# Patient Record
Sex: FEMALE | Race: WHITE | NOT HISPANIC OR LATINO | Employment: FULL TIME | URBAN - METROPOLITAN AREA
[De-identification: names, ages, dates, MRNs, and addresses within clinical notes are randomized per-mention and may not be internally consistent; named-entity substitution may affect disease eponyms.]

---

## 2017-03-13 ENCOUNTER — LAB REQUISITION (OUTPATIENT)
Dept: LAB | Facility: HOSPITAL | Age: 32
End: 2017-03-13
Payer: COMMERCIAL

## 2017-03-13 ENCOUNTER — ALLSCRIPTS OFFICE VISIT (OUTPATIENT)
Dept: OTHER | Facility: OTHER | Age: 32
End: 2017-03-13

## 2017-03-13 DIAGNOSIS — D50.9 IRON DEFICIENCY ANEMIA: ICD-10-CM

## 2017-03-13 DIAGNOSIS — R13.12 DYSPHAGIA, OROPHARYNGEAL PHASE: ICD-10-CM

## 2017-03-13 DIAGNOSIS — R22.1 LOCALIZED SWELLING, MASS OR LUMP OF NECK: ICD-10-CM

## 2017-03-13 DIAGNOSIS — R59.9 ENLARGED LYMPH NODES, UNSPECIFIED: ICD-10-CM

## 2017-03-13 LAB
FERRITIN SERPL-MCNC: 15 NG/ML (ref 8–388)
IRON SERPL-MCNC: 70 UG/DL (ref 50–170)
S PYO AG THROAT QL: NEGATIVE
TIBC SERPL-MCNC: 379 UG/DL (ref 250–450)

## 2017-03-13 PROCEDURE — 82728 ASSAY OF FERRITIN: CPT | Performed by: FAMILY MEDICINE

## 2017-03-13 PROCEDURE — 83540 ASSAY OF IRON: CPT | Performed by: FAMILY MEDICINE

## 2017-03-13 PROCEDURE — 83550 IRON BINDING TEST: CPT | Performed by: FAMILY MEDICINE

## 2017-03-17 ENCOUNTER — ALLSCRIPTS OFFICE VISIT (OUTPATIENT)
Dept: OTHER | Facility: OTHER | Age: 32
End: 2017-03-17

## 2017-03-17 ENCOUNTER — HOSPITAL ENCOUNTER (OUTPATIENT)
Dept: RADIOLOGY | Facility: HOSPITAL | Age: 32
Discharge: HOME | End: 2017-03-17
Payer: COMMERCIAL

## 2017-03-17 DIAGNOSIS — R13.12 DYSPHAGIA, OROPHARYNGEAL PHASE: ICD-10-CM

## 2017-03-17 PROCEDURE — 74220 X-RAY XM ESOPHAGUS 1CNTRST: CPT

## 2017-03-18 ENCOUNTER — APPOINTMENT (OUTPATIENT)
Dept: LAB | Facility: MEDICAL CENTER | Age: 32
End: 2017-03-18
Payer: COMMERCIAL

## 2017-03-18 DIAGNOSIS — R13.12 DYSPHAGIA, OROPHARYNGEAL PHASE: ICD-10-CM

## 2017-03-18 DIAGNOSIS — D50.9 IRON DEFICIENCY ANEMIA: ICD-10-CM

## 2017-03-18 DIAGNOSIS — R22.1 LOCALIZED SWELLING, MASS OR LUMP OF NECK: ICD-10-CM

## 2017-03-18 LAB
ERYTHROCYTE [DISTWIDTH] IN BLOOD BY AUTOMATED COUNT: 12.1 % (ref 11.6–15.1)
HCT VFR BLD AUTO: 37.6 % (ref 34.8–46.1)
HGB BLD-MCNC: 12.1 G/DL (ref 11.5–15.4)
MCH RBC QN AUTO: 29.2 PG (ref 26.8–34.3)
MCHC RBC AUTO-ENTMCNC: 32.2 G/DL (ref 31.4–37.4)
MCV RBC AUTO: 91 FL (ref 82–98)
PLATELET # BLD AUTO: 281 THOUSANDS/UL (ref 149–390)
PMV BLD AUTO: 9.5 FL (ref 8.9–12.7)
RBC # BLD AUTO: 4.15 MILLION/UL (ref 3.81–5.12)
T4 FREE SERPL-MCNC: 1.05 NG/DL (ref 0.76–1.46)
TSH SERPL DL<=0.05 MIU/L-ACNC: 0.84 UIU/ML (ref 0.36–3.74)
WBC # BLD AUTO: 7.51 THOUSAND/UL (ref 4.31–10.16)

## 2017-03-18 PROCEDURE — 85027 COMPLETE CBC AUTOMATED: CPT

## 2017-03-18 PROCEDURE — 36415 COLL VENOUS BLD VENIPUNCTURE: CPT

## 2017-03-18 PROCEDURE — 84439 ASSAY OF FREE THYROXINE: CPT

## 2017-03-18 PROCEDURE — 84443 ASSAY THYROID STIM HORMONE: CPT

## 2017-03-20 ENCOUNTER — GENERIC CONVERSION - ENCOUNTER (OUTPATIENT)
Dept: OTHER | Facility: OTHER | Age: 32
End: 2017-03-20

## 2017-03-28 ENCOUNTER — HOSPITAL ENCOUNTER (OUTPATIENT)
Dept: CT IMAGING | Facility: HOSPITAL | Age: 32
Discharge: HOME | End: 2017-03-28
Payer: COMMERCIAL

## 2017-03-28 DIAGNOSIS — R22.1 LOCALIZED SWELLING, MASS OR LUMP OF NECK: ICD-10-CM

## 2017-03-28 DIAGNOSIS — R13.12 DYSPHAGIA, OROPHARYNGEAL PHASE: ICD-10-CM

## 2017-03-28 DIAGNOSIS — R59.9 ENLARGED LYMPH NODES, UNSPECIFIED: ICD-10-CM

## 2017-03-28 PROCEDURE — 70491 CT SOFT TISSUE NECK W/DYE: CPT

## 2017-03-28 RX ADMIN — IOHEXOL 85 ML: 350 INJECTION, SOLUTION INTRAVENOUS at 19:49

## 2017-03-29 ENCOUNTER — GENERIC CONVERSION - ENCOUNTER (OUTPATIENT)
Dept: OTHER | Facility: OTHER | Age: 32
End: 2017-03-29

## 2017-03-30 ENCOUNTER — GENERIC CONVERSION - ENCOUNTER (OUTPATIENT)
Dept: OTHER | Facility: OTHER | Age: 32
End: 2017-03-30

## 2017-05-10 ENCOUNTER — GENERIC CONVERSION - ENCOUNTER (OUTPATIENT)
Dept: OTHER | Facility: OTHER | Age: 32
End: 2017-05-10

## 2017-05-21 ENCOUNTER — OFFICE VISIT (OUTPATIENT)
Dept: URGENT CARE | Facility: MEDICAL CENTER | Age: 32
End: 2017-05-21
Payer: COMMERCIAL

## 2017-05-21 PROCEDURE — 99202 OFFICE O/P NEW SF 15 MIN: CPT

## 2017-05-22 ENCOUNTER — GENERIC CONVERSION - ENCOUNTER (OUTPATIENT)
Dept: OTHER | Facility: OTHER | Age: 32
End: 2017-05-22

## 2017-06-05 ENCOUNTER — ALLSCRIPTS OFFICE VISIT (OUTPATIENT)
Dept: OTHER | Facility: OTHER | Age: 32
End: 2017-06-05

## 2017-06-19 ENCOUNTER — ALLSCRIPTS OFFICE VISIT (OUTPATIENT)
Dept: OTHER | Facility: OTHER | Age: 32
End: 2017-06-19

## 2017-06-19 ENCOUNTER — LAB REQUISITION (OUTPATIENT)
Dept: LAB | Facility: HOSPITAL | Age: 32
End: 2017-06-19
Payer: COMMERCIAL

## 2017-06-19 DIAGNOSIS — R20.2 PARESTHESIA OF SKIN: ICD-10-CM

## 2017-06-19 DIAGNOSIS — L03.032 CELLULITIS OF TOE OF LEFT FOOT: ICD-10-CM

## 2017-06-19 LAB
ALBUMIN SERPL BCP-MCNC: 4.1 G/DL (ref 3.5–5)
ALP SERPL-CCNC: 75 U/L (ref 46–116)
ALT SERPL W P-5'-P-CCNC: 18 U/L (ref 12–78)
ANION GAP SERPL CALCULATED.3IONS-SCNC: 7 MMOL/L (ref 4–13)
AST SERPL W P-5'-P-CCNC: 13 U/L (ref 5–45)
BASOPHILS # BLD AUTO: 0.01 THOUSANDS/ΜL (ref 0–0.1)
BASOPHILS NFR BLD AUTO: 0 % (ref 0–1)
BILIRUB SERPL-MCNC: 0.42 MG/DL (ref 0.2–1)
BUN SERPL-MCNC: 9 MG/DL (ref 5–25)
CALCIUM SERPL-MCNC: 9.1 MG/DL (ref 8.3–10.1)
CHLORIDE SERPL-SCNC: 107 MMOL/L (ref 100–108)
CO2 SERPL-SCNC: 27 MMOL/L (ref 21–32)
CREAT SERPL-MCNC: 0.6 MG/DL (ref 0.6–1.3)
EOSINOPHIL # BLD AUTO: 0.07 THOUSAND/ΜL (ref 0–0.61)
EOSINOPHIL NFR BLD AUTO: 1 % (ref 0–6)
ERYTHROCYTE [DISTWIDTH] IN BLOOD BY AUTOMATED COUNT: 12.2 % (ref 11.6–15.1)
ERYTHROCYTE [SEDIMENTATION RATE] IN BLOOD: 22 MM/HOUR (ref 0–20)
GFR SERPL CREATININE-BSD FRML MDRD: >60 ML/MIN/1.73SQ M
GLUCOSE P FAST SERPL-MCNC: 95 MG/DL (ref 65–99)
HCT VFR BLD AUTO: 36.7 % (ref 34.8–46.1)
HGB BLD-MCNC: 12.1 G/DL (ref 11.5–15.4)
LYMPHOCYTES # BLD AUTO: 2.59 THOUSANDS/ΜL (ref 0.6–4.47)
LYMPHOCYTES NFR BLD AUTO: 29 % (ref 14–44)
MCH RBC QN AUTO: 29.3 PG (ref 26.8–34.3)
MCHC RBC AUTO-ENTMCNC: 33 G/DL (ref 31.4–37.4)
MCV RBC AUTO: 89 FL (ref 82–98)
MONOCYTES # BLD AUTO: 0.56 THOUSAND/ΜL (ref 0.17–1.22)
MONOCYTES NFR BLD AUTO: 6 % (ref 4–12)
NEUTROPHILS # BLD AUTO: 5.86 THOUSANDS/ΜL (ref 1.85–7.62)
NEUTS SEG NFR BLD AUTO: 64 % (ref 43–75)
NRBC BLD AUTO-RTO: 0 /100 WBCS
PLATELET # BLD AUTO: 263 THOUSANDS/UL (ref 149–390)
PMV BLD AUTO: 9.8 FL (ref 8.9–12.7)
POTASSIUM SERPL-SCNC: 4.3 MMOL/L (ref 3.5–5.3)
PROT SERPL-MCNC: 7.8 G/DL (ref 6.4–8.2)
RBC # BLD AUTO: 4.13 MILLION/UL (ref 3.81–5.12)
SODIUM SERPL-SCNC: 141 MMOL/L (ref 136–145)
URATE SERPL-MCNC: 4.6 MG/DL (ref 2–6.8)
VIT B12 SERPL-MCNC: 458 PG/ML (ref 100–900)
WBC # BLD AUTO: 9.1 THOUSAND/UL (ref 4.31–10.16)

## 2017-06-19 PROCEDURE — 87205 SMEAR GRAM STAIN: CPT | Performed by: FAMILY MEDICINE

## 2017-06-19 PROCEDURE — 80053 COMPREHEN METABOLIC PANEL: CPT | Performed by: FAMILY MEDICINE

## 2017-06-19 PROCEDURE — 86038 ANTINUCLEAR ANTIBODIES: CPT | Performed by: FAMILY MEDICINE

## 2017-06-19 PROCEDURE — 82607 VITAMIN B-12: CPT | Performed by: FAMILY MEDICINE

## 2017-06-19 PROCEDURE — 85025 COMPLETE CBC W/AUTO DIFF WBC: CPT | Performed by: FAMILY MEDICINE

## 2017-06-19 PROCEDURE — 87070 CULTURE OTHR SPECIMN AEROBIC: CPT | Performed by: FAMILY MEDICINE

## 2017-06-19 PROCEDURE — 85652 RBC SED RATE AUTOMATED: CPT | Performed by: FAMILY MEDICINE

## 2017-06-19 PROCEDURE — 84550 ASSAY OF BLOOD/URIC ACID: CPT | Performed by: FAMILY MEDICINE

## 2017-06-21 LAB — RYE IGE QN: NEGATIVE

## 2017-06-22 LAB
BACTERIA WND AEROBE CULT: NORMAL
GRAM STN SPEC: NORMAL

## 2017-06-27 ENCOUNTER — ALLSCRIPTS OFFICE VISIT (OUTPATIENT)
Dept: OTHER | Facility: OTHER | Age: 32
End: 2017-06-27

## 2017-08-03 ENCOUNTER — ALLSCRIPTS OFFICE VISIT (OUTPATIENT)
Dept: OTHER | Facility: OTHER | Age: 32
End: 2017-08-03

## 2017-08-08 ENCOUNTER — GENERIC CONVERSION - ENCOUNTER (OUTPATIENT)
Dept: OTHER | Facility: OTHER | Age: 32
End: 2017-08-08

## 2017-08-14 ENCOUNTER — GENERIC CONVERSION - ENCOUNTER (OUTPATIENT)
Dept: OTHER | Facility: OTHER | Age: 32
End: 2017-08-14

## 2017-08-14 LAB
ADEQUACY: (HISTORICAL): ABNORMAL
CLINICIAN PROVIDIED ICD 9 OR 10 (HISTORICAL): ABNORMAL
COMMENT (HISTORICAL): ABNORMAL
DIAGNOSIS (HISTORICAL): ABNORMAL
ELECTRONICALLY SIGNED BY (HISTORICAL): ABNORMAL
HPV HIGH RISK (HISTORICAL): NEGATIVE
NOTE: (HISTORICAL): ABNORMAL
PATHOLOGIST PROVIDED ICD10 (HISTORICAL): ABNORMAL
PERFORMED BY (HISTORICAL): ABNORMAL
RECOMMENDATION (HISTORICAL): ABNORMAL
TEST INFORMATION (HISTORICAL): ABNORMAL

## 2018-01-10 NOTE — RESULT NOTES
Verified Results  (1) CBC/ PLT (NO DIFF) [id number] 09:48AM Belinda Spruce   TW Order Number: [id number]     Test Name Result Flag Reference   HEMATOCRIT 37 6 %  34 8-46 1   HEMOGLOBIN 12 1 g/dL  11 5-15 4   MCHC 32 2 g/dL  31 4-37 4   MCH 29 2 pg  26 8-34 3   MCV 91 fL  82-98   PLATELET COUNT 287 Thousands/uL  149-390   RBC COUNT 4 15 Million/uL  3 81-5 12   RDW 12 1 %  11 6-15 1   WBC COUNT 7 51 Thousand/uL  4 31-10 16   MPV 9 5 fL  8 9-12 7     (1) TSH [id number] 09:48AM Belinda Spruce   TW Order Number: [id number]     Test Name Result Flag Reference   TSH 0 837 uIU/mL  0 358-3 740   - Patient Instructions: This bloodwork is non-fasting  Please drink two glasses of water morning of bloodwork  - Patient Instructions: This bloodwork is non-fasting  Please drink two glasses of water morning of bloodwork  Patients undergoing fluorescein dye angiography may retain small amounts of fluorescein in the body for 48-72 hours post procedure  Samples containing fluorescein can produce falsely depressed TSH values  If the patient had this procedure,a specimen should be resubmitted post fluorescein clearance  The recommended reference ranges for TSH during pregnancy are as follows:  First trimester 0 1 to 2 5 uIU/mL  Second trimester  0 2 to 3 0 uIU/mL  Third trimester 0 3 to 3 0 uIU/m     (1) T4, FREE [id number] 09:48AM Belinda Spruce   TW Order Number: [id number]     Test Name Result Flag Reference   T4,FREE 1 05 ng/dL  0 76-1 46   - Patient Instructions: This bloodwork is non-fasting  Please drink two glasses of water morning of bloodwork         Plan  Iron deficiency anemia, Oropharyngeal dysphagia, Sensation of lump in throat    · US THYROID; Status:Canceled - Scheduling;   Lymphoid hyperplasia, Oropharyngeal dysphagia, Sensation of lump in throat    · 2 - Wakstein DO, Pink Bernheim (Otolaryngology) Physician Referral  Consult Only: the  expectation is that the referring provider will communicate back to the patient on  treatment options  Evaluation and Treatment: the expectation is that the referred to  provider will communicate back to the patient on treatment options  Status: Hold For  - Scheduling  Requested for: [id number]  Care Summary provided  : Yes   · * CT SOFT TISSUE NECK W CONTRAST; Status:Need Information - Financial  Authorization;  Requested for:20Mar2017;

## 2018-01-12 VITALS
DIASTOLIC BLOOD PRESSURE: 80 MMHG | HEART RATE: 81 BPM | TEMPERATURE: 98.6 F | HEIGHT: 67 IN | SYSTOLIC BLOOD PRESSURE: 120 MMHG | BODY MASS INDEX: 25.11 KG/M2 | OXYGEN SATURATION: 99 % | WEIGHT: 160 LBS

## 2018-01-12 VITALS
TEMPERATURE: 97.6 F | RESPIRATION RATE: 16 BRPM | HEIGHT: 67 IN | DIASTOLIC BLOOD PRESSURE: 80 MMHG | OXYGEN SATURATION: 88 % | BODY MASS INDEX: 26.53 KG/M2 | HEART RATE: 83 BPM | SYSTOLIC BLOOD PRESSURE: 114 MMHG | WEIGHT: 169.06 LBS

## 2018-01-13 VITALS
OXYGEN SATURATION: 98 % | RESPIRATION RATE: 16 BRPM | SYSTOLIC BLOOD PRESSURE: 140 MMHG | WEIGHT: 165 LBS | HEIGHT: 67 IN | HEART RATE: 73 BPM | DIASTOLIC BLOOD PRESSURE: 80 MMHG | TEMPERATURE: 96.6 F | BODY MASS INDEX: 25.9 KG/M2

## 2018-01-13 VITALS
SYSTOLIC BLOOD PRESSURE: 128 MMHG | BODY MASS INDEX: 25.27 KG/M2 | DIASTOLIC BLOOD PRESSURE: 80 MMHG | WEIGHT: 161 LBS | HEIGHT: 67 IN

## 2018-01-13 VITALS
WEIGHT: 157.06 LBS | HEART RATE: 70 BPM | SYSTOLIC BLOOD PRESSURE: 128 MMHG | BODY MASS INDEX: 24.65 KG/M2 | DIASTOLIC BLOOD PRESSURE: 90 MMHG | HEIGHT: 67 IN | TEMPERATURE: 97.9 F | OXYGEN SATURATION: 99 % | RESPIRATION RATE: 16 BRPM

## 2018-01-13 VITALS
HEART RATE: 68 BPM | WEIGHT: 162 LBS | SYSTOLIC BLOOD PRESSURE: 110 MMHG | DIASTOLIC BLOOD PRESSURE: 70 MMHG | OXYGEN SATURATION: 99 % | TEMPERATURE: 98.1 F | HEIGHT: 67 IN | BODY MASS INDEX: 25.43 KG/M2

## 2018-01-13 NOTE — RESULT NOTES
Verified Results  (LC) PapIG, HPV, rfx 16/18 [id number] 12:00AM ReneParis spears   No  of containers  [facility] 01 CYTYC Thin Prep Vial     Test Name Result Flag Reference   DIAGNOSIS: Comment A    EPITHELIAL CELL ABNORMALITY  ATYPICAL SQUAMOUS CELLS OF UNDETERMINED SIGNIFICANCE  Recommendation: Comment A    Suggest follow up as clinically appropriate  Specimen adequacy: Comment     Satisfactory for evaluation  Endocervical and/or squamous metaplastic  cells (endocervical component) are present  Clinician provided ICD10: Comment     Z01 419   Performed by: Denae Flores, Cytotechnologist (ASCP)   Electronically signed by: Jeremy Cerrato DO, FCAP, Pathologist   Comm   Pathologist provided ICD10: Comment     R87 610   Note: Comment     The Pap smear is a screening test designed to aid in the detection of  premalignant and malignant conditions of the uterine cervix  It is not a  diagnostic procedure and should not be used as the sole means of detecting  cervical cancer  Both false-positive and false-negative reports do occur  Test Methodology: Comment     This liquid based ThinPrep(R) pap test was screened with the  use of an image guided system  HPV, high-risk Negative  Negative   This high-risk HPV test detects thirteen high-risk types  (16/18/31/33/35/39/45/51/52/56/58/59/68) without differentiation

## 2018-01-16 NOTE — RESULT NOTES
Verified Results  * CT SOFT TISSUE NECK W CONTRAST [id number] 07:21PM [facility] Order Number: [id number]   Performing Comments: abnormal barium swallow - lymphoid hyperplasia base of tongue with swallowing difficulty and fullness in neck  - Patient Instructions: To schedule this appointment, please contact Central Scheduling at [phone]  Test Name Result Flag Reference   CT SOFT TISSUE NECK W CONTRAST (Report)     CT NECK WITH CONTRAST     INDICATION: R13 12: Dysphagia, oropharyngeal phase   R22 1: Localized swelling, mass and lump, neck   R59 9: Enlarged lymph nodes, unspecified  History taken directly from the electronic ordering system  COMPARISON: Esophagram performed on 3/17/2017     TECHNIQUE: Contiguous 2 5 mm images were obtained through the neck after administration of intravenous contrast       Radiation dose length product (DLP) for this visit: 471 mGy-cm   This examination, like all CT scans performed in the [facility], was performed utilizing techniques to minimize radiation dose exposure, including the use of iterative    reconstruction and automated exposure control  IV Contrast: 85 mL of iohexol (OMNIPAQUE)          IMAGE QUALITY: Diagnostic  FINDINGS:     VISUALIZED BRAIN PARENCHYMA: No acute intracranial pathology of the visualized brain parenchyma  VISUALIZED ORBITS AND PARANASAL SINUSES: Normal      NASAL CAVITY AND NASOPHARYNX: Normal      SUPRAHYOID NECK: Normal oral cavity, tongue base, tonsillar fossa and epiglottis  Prevertebral soft tissues are normal  Mild prominence of lingual tonsils without evidence for mass and within normal limits  INFRAHYOID NECK: Aryepiglottic folds, laryngeal tissues, and piriform sinuses are normal         THYROID GLAND: 9 mm nodule in the right thyroid lobe  Does not require further follow-up  PAROTID AND SUBMANDIBULAR GLANDS: Normal      LYMPH NODES: No pathologic or enlarged adenopathy  VASCULAR STRUCTURES: Normal enhancement of the cervical vasculature  THORACIC INLET: Lung apices and upper mediastinum are unremarkable  BONY STRUCTURES: Normal        IMPRESSION:     No soft tissue mass or pathologic adenopathy in the neck  Aerodigestive tract is unremarkable as detailed         Workstation performed: [id number]     Signed by:   Alivia Nuñez MD   3/29/17

## 2018-06-26 ENCOUNTER — OFFICE VISIT (OUTPATIENT)
Dept: URGENT CARE | Facility: MEDICAL CENTER | Age: 33
End: 2018-06-26
Payer: COMMERCIAL

## 2018-06-26 VITALS
OXYGEN SATURATION: 100 % | HEIGHT: 66 IN | BODY MASS INDEX: 25.71 KG/M2 | SYSTOLIC BLOOD PRESSURE: 124 MMHG | HEART RATE: 62 BPM | DIASTOLIC BLOOD PRESSURE: 82 MMHG | WEIGHT: 160 LBS | RESPIRATION RATE: 18 BRPM | TEMPERATURE: 98.4 F

## 2018-06-26 DIAGNOSIS — R21 RASH: Primary | ICD-10-CM

## 2018-06-26 PROCEDURE — 99213 OFFICE O/P EST LOW 20 MIN: CPT | Performed by: NURSE PRACTITIONER

## 2018-06-26 RX ORDER — CETIRIZINE HYDROCHLORIDE 10 MG/1
TABLET ORAL
COMMUNITY

## 2018-06-26 RX ORDER — PREDNISONE 10 MG/1
10 TABLET ORAL DAILY
Qty: 21 TABLET | Refills: 0 | Status: SHIPPED | OUTPATIENT
Start: 2018-06-26 | End: 2018-07-25 | Stop reason: ALTCHOICE

## 2018-06-26 NOTE — PROGRESS NOTES
[facility]        NAME: Nalini Ledezma is a 28 y o  female  : 1985    MRN: [id number]  DATE: 2018  TIME: 6:58 PM    Assessment and Plan   Rash [R21]  1  Rash  predniSONE 10 mg tablet         Patient Instructions     1  Prednisone as directed  2  Benadryl at night for itching   3  Keep area clean and dry   4  Follow up with your PCP if rash worsens or does not resolve  Chief Complaint     Chief Complaint   Patient presents with    Urticaria     itchy lesions began Weds: have spread to upper back and chest/neck  topical preps not effective         History of Present Illness       27 y/o female presents for rash that started on the rght side of her back 6 days ago  It is now spreading to her upper back and upper anterior chest/neck  It is itchy  She has applied benadryl cream and cortisone cream without improvement  She takes Zrytec daily for environmental allergies  She denies fever, chills, or pain  She denies exposure to any new hygiene or cleaning products  Urticaria   Pertinent negatives include no fever or sore throat  Review of Systems   Review of Systems   Constitutional: Negative for activity change and fever  HENT: Negative for sore throat  Respiratory: Negative for wheezing  Skin: Positive for rash  Negative for color change           Current Medications       Current Outpatient Prescriptions:     cetirizine (ZYRTEC ALLERGY) 10 mg tablet, Take by mouth, Disp: , Rfl:     predniSONE 10 mg tablet, Take 1 tablet (10 mg total) by mouth daily 60mg days 1, 50mg day 2, 40mg day 3, 30mg day 4, 20 mg day 5, 10mg day 6 , Disp: 21 tablet, Rfl: 0    Current Allergies     Allergies as of 2018    (No Known Allergies)            The following portions of the patient's history were reviewed and updated as appropriate: allergies, current medications, past family history, past medical history, past social history, past surgical history and problem list      No past medical history on file  No past surgical history on file  No family history on file  Medications have been verified  Objective   /82   Pulse 62   Temp 98 4 °F (36 9 °C)   Resp 18   Ht 5' 6" (1 676 m)   Wt 72 6 kg (160 lb)   SpO2 100%   BMI 25 82 kg/m²        Physical Exam     Physical Exam   Constitutional: She is oriented to person, place, and time  Vital signs are normal  She appears well-developed and well-nourished  She is active  No distress  HENT:   Head: Normocephalic  Cardiovascular: Normal rate  Pulmonary/Chest: Effort normal    Neurological: She is alert and oriented to person, place, and time  No cranial nerve deficit  Skin: Skin is warm and dry  Rash noted   Rash is maculopapular and urticarial

## 2018-06-26 NOTE — PATIENT INSTRUCTIONS
1  Prednisone as directed  2  Benadryl at night for itching   3  Keep area clean and dry   4  Follow up with your PCP if rash worsens or does not resolve  Acute Rash   WHAT YOU NEED TO KNOW:   A rash is irritation, redness, or itchiness in the skin or mucus membranes  Mucus membranes are areas such as the lining of your nose or throat  Acute means the rash starts suddenly, worsens quickly, and lasts a short time  An acute rash may be caused by a disease, such as hepatitis or vasculitis  The rash may be a reaction to something you are allergic to, such as certain foods, or latex  Certain medicines, including antibiotics, NSAIDs, prescription pain medicine, and aspirin can also cause a rash  DISCHARGE INSTRUCTIONS:   Return to the emergency department if:   · You have sudden trouble breathing or chest pain  · You are vomiting, have a headache or muscle aches, and your throat hurts  Contact your healthcare provider if:   · You have a fever  · You get open wounds from scratching your skin, or you have a wound that is red, swollen, or painful  · Your rash lasts longer than 3 months  · You have swelling or pain in your joints  · You have questions or concerns about your condition or care  Medicines:  If your rash does not go away on its own, you may need the following medicines:  · Antihistamines  may be given to help decrease itching  · Steroids  may be given to decrease inflammation  · Antibiotics  help fight or prevent a bacterial infection  · Take your medicine as directed  Contact your healthcare provider if you think your medicine is not helping or if you have side effects  Tell him of her if you are allergic to any medicine  Keep a list of the medicines, vitamins, and herbs you take  Include the amounts, and when and why you take them  Bring the list or the pill bottles to follow-up visits  Carry your medicine list with you in case of an emergency    Prevent a rash or care for your skin when you have a rash:  Dry skin can lead to more problems  Do not scratch your skin if it itches  You may cause a skin infection by scratching  The following may prevent dry skin, and help your skin look better:  · Use thick cream lotions or petroleum jelly to help soothe your rash  These products work well on areas with thick skin, such as your feet  Cool compresses may also be used to soothe your skin  Apply a cool compress or a cool, wet towel, and then cover it with a dry towel  · Use lukewarm water when you bathe  Hot water may damage your skin more  Pat your skin dry  Do not rub your skin with a towel  · Use detergents, soaps, shampoos, and bubble baths made for sensitive skin  Wear clothes that are made of cotton instead of nylon or wool  Cotton is softer, so it will not hurt your skin as much  Follow up with your healthcare provider as directed: You may need to see a dermatologist if healthcare providers do not know what is causing your rash  You may also need to see a dermatologist if your rash does not get better even with treatment  You may need to see a dietitian if you have allergies to foods  Write down your questions so you remember to ask them during your visits  © 2017 [facility] Information is for End User's use only and may not be sold, redistributed or otherwise used for commercial purposes  All illustrations and images included in CareNotes® are the copyrighted property of [employer]  or [address]  The above information is an  only  It is not intended as medical advice for individual conditions or treatments  Talk to your doctor, nurse or pharmacist before following any medical regimen to see if it is safe and effective for you

## 2018-07-25 ENCOUNTER — OFFICE VISIT (OUTPATIENT)
Dept: URGENT CARE | Facility: MEDICAL CENTER | Age: 33
End: 2018-07-25
Payer: COMMERCIAL

## 2018-07-25 VITALS
DIASTOLIC BLOOD PRESSURE: 64 MMHG | WEIGHT: 160 LBS | HEART RATE: 74 BPM | TEMPERATURE: 97.9 F | BODY MASS INDEX: 25.71 KG/M2 | RESPIRATION RATE: 18 BRPM | HEIGHT: 66 IN | SYSTOLIC BLOOD PRESSURE: 100 MMHG | OXYGEN SATURATION: 99 %

## 2018-07-25 DIAGNOSIS — L25.9 CONTACT DERMATITIS, UNSPECIFIED CONTACT DERMATITIS TYPE, UNSPECIFIED TRIGGER: Primary | ICD-10-CM

## 2018-07-25 PROCEDURE — 99213 OFFICE O/P EST LOW 20 MIN: CPT | Performed by: PHYSICIAN ASSISTANT

## 2018-07-25 RX ORDER — PREDNISONE 20 MG/1
TABLET ORAL
Qty: 15 TABLET | Refills: 0 | Status: SHIPPED | OUTPATIENT
Start: 2018-07-25 | End: 2018-12-03 | Stop reason: ALTCHOICE

## 2018-07-26 NOTE — PATIENT INSTRUCTIONS
Contact Dermatitis   AMBULATORY CARE:   Contact dermatitis  is a rash  It develops when you touch something that irritates your skin or causes an allergic reaction  Common signs and symptoms include the following:   · Red, swollen, painful rash           · Skin that itches, stings, or burns    · Dry, scaly, or crusty skin patches    · Bumps or blisters    · Fluid draining from blisters  Call 911 for any of the following:   · You have sudden trouble breathing  · Your throat swells and you have trouble eating  · Your face is swollen  Contact your healthcare provider if:   · You have a fever  · Your blisters are draining pus  · Your rash spreads or does not get better, even after treatment  · You have questions or concerns about your condition or care  Treatment for contact dermatitis  involves removing any irritants or allergens that cause your rash  You may also need medicines to decrease itching and swelling  They will be given as a topical medicine to apply to your rash or as a pill  Manage contact dermatitis:   · Take short baths or showers in cool water  Use mild soap or soap-free cleansers  Add oatmeal, baking soda, or cornstarch to the bath water to help decrease skin irritation  · Avoid skin irritants , such as makeup, hair products, soaps, and cleansers  Use products that do not contain perfume or dye  · Apply a cool compress to your rash  This will help soothe your skin  · Keep your skin moist   Rub unscented cream or lotion on your skin to prevent dryness and itching  Do this right after a bath or shower when your skin is still damp  Follow up with your healthcare provider as directed:  Write down your questions so you remember to ask them during your visits  © 2017 [employer] Information is for End User's use only and may not be sold, redistributed or otherwise used for commercial purposes   All illustrations and images included in CareNotes® are the copyrighted property of [employer]  or Sergio Hicks  The above information is an  only  It is not intended as medical advice for individual conditions or treatments  Talk to your doctor, nurse or pharmacist before following any medical regimen to see if it is safe and effective for you

## 2018-07-26 NOTE — PROGRESS NOTES
[facility]        NAME: Donte Saldaña is a 28 y o  female  : 1985    MRN: [id number]  DATE: 2018  TIME: 8:33 PM    Assessment and Plan   Contact dermatitis, unspecified contact dermatitis type, unspecified trigger [L25 9]  1  Contact dermatitis, unspecified contact dermatitis type, unspecified trigger  predniSONE 20 mg tablet    fluocinonide (LIDEX) 0 05 % cream         Patient Instructions       Advised to use the cream arm chest, did not use on face or eyes  Take prednisone as directed  Continued taking Zyrtec, take Benadryl at night  Follow up with PCP in 3-5 days  Proceed to  ER if symptoms worsen  Chief Complaint     Chief Complaint   Patient presents with    Rash     x 2 weeks to LUE;more pronounce (and poss spreading?) last day or so         History of Present Illness   Donte Saldaña presents to the clinic c/o     28 female presents for evaluation of a rash on her left arm for approximately 2 weeks  Patient states that has worsened, and now spread onto her chest and on her face  She does have a history of eczema she has been taking Zyrtec daily as well as getting allergy shots  This has worsened over the last few days, she states that she is trying to that she  She has been applying topical Benadryl as well as trying to use alcohol to dry out the rash  Rash         Review of Systems   Review of Systems   Respiratory: Negative  Cardiovascular: Negative  Skin: Positive for rash           Current Medications     Long-Term Prescriptions   Medication Sig Dispense Refill    cetirizine (ZYRTEC ALLERGY) 10 mg tablet Take by mouth      fluocinonide (LIDEX) 0 05 % cream Apply topically 2 (two) times a day 30 g 0       Current Allergies     Allergies as of 2018    (No Known Allergies)            The following portions of the patient's history were reviewed and updated as appropriate: allergies, current medications, past family history, past medical history, past social history, past surgical history and problem list     Objective   /64   Pulse 74   Temp 97 9 °F (36 6 °C)   Resp 18   Ht 5' 6" (1 676 m)   Wt 72 6 kg (160 lb)   SpO2 99%   BMI 25 82 kg/m²        Physical Exam     Physical Exam   Constitutional: She appears well-developed and well-nourished  No distress  HENT:   Head: Normocephalic and atraumatic  Cardiovascular: Normal rate, regular rhythm and normal heart sounds  Pulmonary/Chest: Effort normal and breath sounds normal  No respiratory distress  She has no wheezes  She has no rales  Skin: Skin is warm and dry  She is not diaphoretic  Erythematous papular lesions on inner left elbow, that are blanchable  There is also erythema on the chest and face that is blanchable this significant for atopic dermatitis / contact dermatitis   Nursing note and vitals reviewed

## 2018-11-09 ENCOUNTER — CLINICAL SUPPORT (OUTPATIENT)
Dept: OBGYN CLINIC | Facility: MEDICAL CENTER | Age: 33
End: 2018-11-09
Payer: COMMERCIAL

## 2018-11-09 DIAGNOSIS — Z32.01 POSITIVE URINE PREGNANCY TEST: Primary | ICD-10-CM

## 2018-11-09 LAB
B-HCG SERPL-ACNC: 9539 MIU/ML
PROGEST SERPL-MCNC: 13.5 NG/ML

## 2018-11-09 PROCEDURE — 84702 CHORIONIC GONADOTROPIN TEST: CPT | Performed by: OBSTETRICS & GYNECOLOGY

## 2018-11-09 PROCEDURE — 84144 ASSAY OF PROGESTERONE: CPT | Performed by: OBSTETRICS & GYNECOLOGY

## 2018-11-09 PROCEDURE — 36415 COLL VENOUS BLD VENIPUNCTURE: CPT | Performed by: OBSTETRICS & GYNECOLOGY

## 2018-11-12 ENCOUNTER — TELEPHONE (OUTPATIENT)
Dept: OBGYN CLINIC | Facility: MEDICAL CENTER | Age: 33
End: 2018-11-12

## 2018-11-12 DIAGNOSIS — Z32.01 POSITIVE BLOOD PREGNANCY TEST: Primary | ICD-10-CM

## 2018-11-19 ENCOUNTER — HOSPITAL ENCOUNTER (OUTPATIENT)
Dept: ULTRASOUND IMAGING | Facility: MEDICAL CENTER | Age: 33
Discharge: HOME | End: 2018-11-19
Payer: COMMERCIAL

## 2018-11-19 DIAGNOSIS — Z32.01 POSITIVE BLOOD PREGNANCY TEST: ICD-10-CM

## 2018-11-19 PROCEDURE — 76801 OB US < 14 WKS SINGLE FETUS: CPT

## 2018-12-03 ENCOUNTER — INITIAL PRENATAL (OUTPATIENT)
Dept: OBGYN CLINIC | Facility: MEDICAL CENTER | Age: 33
End: 2018-12-03
Payer: COMMERCIAL

## 2018-12-03 DIAGNOSIS — Z34.91 ENCOUNTER FOR PREGNANCY RELATED EXAMINATION IN FIRST TRIMESTER: Primary | ICD-10-CM

## 2018-12-03 PROCEDURE — 81001 URINALYSIS AUTO W/SCOPE: CPT

## 2018-12-03 PROCEDURE — 87147 CULTURE TYPE IMMUNOLOGIC: CPT | Performed by: NURSE PRACTITIONER

## 2018-12-03 PROCEDURE — 80081 OBSTETRIC PANEL INC HIV TSTG: CPT | Performed by: NURSE PRACTITIONER

## 2018-12-03 PROCEDURE — OBC: Performed by: OBSTETRICS & GYNECOLOGY

## 2018-12-03 PROCEDURE — 36415 COLL VENOUS BLD VENIPUNCTURE: CPT | Performed by: OBSTETRICS & GYNECOLOGY

## 2018-12-03 PROCEDURE — 87086 URINE CULTURE/COLONY COUNT: CPT | Performed by: NURSE PRACTITIONER

## 2018-12-03 NOTE — PROGRESS NOTES
OB Intake  o Patient presents for OB intake interview    o Hx of  delivery prior to 36 weeks 6 days: NO  o LMP: Patient's last menstrual period was 2018   o Tdap:  - Counseled to be given after 28 weeks  o Influenza vaccine discussed   Vaccinated: NO   Encouraged to obtain at PCP or local pharmacy  o MRSA questionnaire: NEGATIVE  o Dental visit within last 6 months: Has appt  Scheduled  Dental letter given      Interview education:  Abi Morales Pregnancy Essentials booklet given to patient  Reviewed and explained   Handouts given at todays visit  o [facility] me phone application guide  o [facility] support center  o CDCs Response to [address] [place] [address] Maternal Fetal Medicine  - Sequential screening pamphlet  Declined referral to genetic counselor  - Cystic fibrosis pamphlet    Declined testing today    Interview done by: Aye Mar RN 18

## 2018-12-04 LAB
ABO GROUP BLD: NORMAL
BACTERIA UR QL AUTO: NORMAL /HPF
BASOPHILS # BLD AUTO: 0.02 THOUSANDS/ΜL (ref 0–0.1)
BASOPHILS NFR BLD AUTO: 0 % (ref 0–1)
BILIRUB UR QL STRIP: NEGATIVE
BLD GP AB SCN SERPL QL: NEGATIVE
CLARITY UR: CLEAR
COLOR UR: YELLOW
EOSINOPHIL # BLD AUTO: 0.04 THOUSAND/ΜL (ref 0–0.61)
EOSINOPHIL NFR BLD AUTO: 1 % (ref 0–6)
ERYTHROCYTE [DISTWIDTH] IN BLOOD BY AUTOMATED COUNT: 14.6 % (ref 11.6–15.1)
GLUCOSE UR STRIP-MCNC: NEGATIVE MG/DL
HBV SURFACE AG SER QL: NORMAL
HCT VFR BLD AUTO: 40.2 % (ref 34.8–46.1)
HGB BLD-MCNC: 11.9 G/DL (ref 11.5–15.4)
HGB UR QL STRIP.AUTO: NEGATIVE
HYALINE CASTS #/AREA URNS LPF: NORMAL /LPF
IMM GRANULOCYTES # BLD AUTO: 0.02 THOUSAND/UL (ref 0–0.2)
IMM GRANULOCYTES NFR BLD AUTO: 0 % (ref 0–2)
KETONES UR STRIP-MCNC: NEGATIVE MG/DL
LEUKOCYTE ESTERASE UR QL STRIP: ABNORMAL
LYMPHOCYTES # BLD AUTO: 2.21 THOUSANDS/ΜL (ref 0.6–4.47)
LYMPHOCYTES NFR BLD AUTO: 25 % (ref 14–44)
MCH RBC QN AUTO: 27.6 PG (ref 26.8–34.3)
MCHC RBC AUTO-ENTMCNC: 29.6 G/DL (ref 31.4–37.4)
MCV RBC AUTO: 93 FL (ref 82–98)
MONOCYTES # BLD AUTO: 0.52 THOUSAND/ΜL (ref 0.17–1.22)
MONOCYTES NFR BLD AUTO: 6 % (ref 4–12)
NEUTROPHILS # BLD AUTO: 5.97 THOUSANDS/ΜL (ref 1.85–7.62)
NEUTS SEG NFR BLD AUTO: 68 % (ref 43–75)
NITRITE UR QL STRIP: NEGATIVE
NON-SQ EPI CELLS URNS QL MICRO: NORMAL /HPF
NRBC BLD AUTO-RTO: 0 /100 WBCS
PH UR STRIP.AUTO: 6.5 [PH] (ref 4.5–8)
PLATELET # BLD AUTO: 286 THOUSANDS/UL (ref 149–390)
PMV BLD AUTO: 9.9 FL (ref 8.9–12.7)
PROT UR STRIP-MCNC: NEGATIVE MG/DL
RBC # BLD AUTO: 4.31 MILLION/UL (ref 3.81–5.12)
RBC #/AREA URNS AUTO: NORMAL /HPF
RH BLD: POSITIVE
RUBV IGG SERPL IA-ACNC: 40.2 IU/ML
SP GR UR STRIP.AUTO: 1.01 (ref 1–1.03)
SPECIMEN EXPIRATION DATE: NORMAL
UROBILINOGEN UR QL STRIP.AUTO: 0.2 E.U./DL
WBC # BLD AUTO: 8.78 THOUSAND/UL (ref 4.31–10.16)
WBC #/AREA URNS AUTO: NORMAL /HPF

## 2018-12-05 LAB
BACTERIA UR CULT: ABNORMAL
BACTERIA UR CULT: ABNORMAL
HIV 1+2 AB+HIV1 P24 AG SERPL QL IA: NORMAL
RPR SER QL: NORMAL

## 2018-12-10 DIAGNOSIS — O23.41 GROUP B STREPTOCOCCUS URINARY TRACT INFECTION AFFECTING PREGNANCY IN FIRST TRIMESTER: Primary | ICD-10-CM

## 2018-12-10 DIAGNOSIS — B95.1 GROUP B STREPTOCOCCUS URINARY TRACT INFECTION AFFECTING PREGNANCY IN FIRST TRIMESTER: Primary | ICD-10-CM

## 2018-12-10 RX ORDER — CEPHALEXIN 500 MG/1
500 CAPSULE ORAL EVERY 12 HOURS SCHEDULED
Qty: 14 CAPSULE | Refills: 0 | Status: CANCELLED | OUTPATIENT
Start: 2018-12-10 | End: 2018-12-17

## 2018-12-10 NOTE — TELEPHONE ENCOUNTER
Notified of positive GBS in urine and need for treatment now and when in labor    Order for Keflex 500mg PO BID x7 days called to pharmacy

## 2018-12-24 ENCOUNTER — INITIAL PRENATAL (OUTPATIENT)
Dept: OBGYN CLINIC | Facility: MEDICAL CENTER | Age: 33
End: 2018-12-24

## 2018-12-24 VITALS — DIASTOLIC BLOOD PRESSURE: 72 MMHG | SYSTOLIC BLOOD PRESSURE: 118 MMHG | BODY MASS INDEX: 28.57 KG/M2 | WEIGHT: 177 LBS

## 2018-12-24 DIAGNOSIS — Z11.3 SCREENING EXAMINATION FOR STD (SEXUALLY TRANSMITTED DISEASE): ICD-10-CM

## 2018-12-24 DIAGNOSIS — N89.8 VAGINAL DISCHARGE: ICD-10-CM

## 2018-12-24 DIAGNOSIS — Z3A.12 12 WEEKS GESTATION OF PREGNANCY: Primary | ICD-10-CM

## 2018-12-24 DIAGNOSIS — Z34.91 FIRST TRIMESTER PREGNANCY: ICD-10-CM

## 2018-12-24 PROCEDURE — 87510 GARDNER VAG DNA DIR PROBE: CPT | Performed by: NURSE PRACTITIONER

## 2018-12-24 PROCEDURE — PNV: Performed by: NURSE PRACTITIONER

## 2018-12-24 PROCEDURE — 87591 N.GONORRHOEAE DNA AMP PROB: CPT | Performed by: NURSE PRACTITIONER

## 2018-12-24 PROCEDURE — 87480 CANDIDA DNA DIR PROBE: CPT | Performed by: NURSE PRACTITIONER

## 2018-12-24 PROCEDURE — 87660 TRICHOMONAS VAGIN DIR PROBE: CPT | Performed by: NURSE PRACTITIONER

## 2018-12-24 PROCEDURE — 87491 CHLMYD TRACH DNA AMP PROBE: CPT | Performed by: NURSE PRACTITIONER

## 2018-12-24 NOTE — PROGRESS NOTES
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

## 2018-12-26 LAB
C TRACH DNA SPEC QL NAA+PROBE: NEGATIVE
CANDIDA RRNA VAG QL PROBE: POSITIVE
G VAGINALIS RRNA GENITAL QL PROBE: NEGATIVE
N GONORRHOEA DNA SPEC QL NAA+PROBE: NEGATIVE
T VAGINALIS RRNA GENITAL QL PROBE: NEGATIVE

## 2018-12-27 ENCOUNTER — TELEPHONE (OUTPATIENT)
Dept: OBGYN CLINIC | Facility: MEDICAL CENTER | Age: 33
End: 2018-12-27

## 2018-12-27 DIAGNOSIS — B37.9 CANDIDIASIS: Primary | ICD-10-CM

## 2018-12-27 RX ORDER — FLUCONAZOLE 200 MG/1
200 TABLET ORAL DAILY
Qty: 2 TABLET | Refills: 0 | Status: SHIPPED | OUTPATIENT
Start: 2018-12-27 | End: 2018-12-29

## 2018-12-27 NOTE — TELEPHONE ENCOUNTER
I spoke with the patient and left her know about the results of her culture coming back positive for yeast   Per Merlinda Monday she had the choice of Monistat over the counter or she could have an rx sent for Diflucan 200 mg  The patient chose the rx for the Diflucan and I will then send a rx refill for Merlinda Monday to sign off on

## 2018-12-31 ENCOUNTER — ROUTINE PRENATAL (OUTPATIENT)
Dept: PERINATAL CARE | Facility: CLINIC | Age: 33
End: 2018-12-31
Payer: COMMERCIAL

## 2018-12-31 VITALS
HEART RATE: 94 BPM | BODY MASS INDEX: 27.64 KG/M2 | DIASTOLIC BLOOD PRESSURE: 75 MMHG | HEIGHT: 66 IN | SYSTOLIC BLOOD PRESSURE: 118 MMHG | WEIGHT: 172 LBS

## 2018-12-31 DIAGNOSIS — Z82.79 FAMILY HISTORY OF CONGENITAL OR GENETIC CONDITION: ICD-10-CM

## 2018-12-31 DIAGNOSIS — Z36.82 ENCOUNTER FOR ANTENATAL SCREENING FOR NUCHAL TRANSLUCENCY: Primary | ICD-10-CM

## 2018-12-31 DIAGNOSIS — Z3A.13 13 WEEKS GESTATION OF PREGNANCY: ICD-10-CM

## 2018-12-31 PROCEDURE — 99241 PR OFFICE CONSULTATION NEW/ESTAB PATIENT 15 MIN: CPT | Performed by: OBSTETRICS & GYNECOLOGY

## 2018-12-31 PROCEDURE — 76813 OB US NUCHAL MEAS 1 GEST: CPT | Performed by: OBSTETRICS & GYNECOLOGY

## 2018-12-31 RX ORDER — CEPHALEXIN 500 MG/1
CAPSULE ORAL
Refills: 0 | COMMUNITY
Start: 2018-12-11 | End: 2019-01-22 | Stop reason: ALTCHOICE

## 2018-12-31 NOTE — PATIENT INSTRUCTIONS
Please obtain the records from consultation at [address] [place] [address] and from your son's genetic testing results from [facility]

## 2018-12-31 NOTE — PROGRESS NOTES
DINO Fox 53: Ms Nivia Palacio was seen today at 13w1d for nuchal translucency ultrasound  See ultrasound report under "OB Procedures" tab  Please don't hesitate to contact our office with any concerns or questions    Priti Ram MD

## 2019-01-09 ENCOUNTER — TELEPHONE (OUTPATIENT)
Dept: PERINATAL CARE | Facility: CLINIC | Age: 34
End: 2019-01-09

## 2019-01-22 ENCOUNTER — ROUTINE PRENATAL (OUTPATIENT)
Dept: OBGYN CLINIC | Facility: MEDICAL CENTER | Age: 34
End: 2019-01-22

## 2019-01-22 VITALS — WEIGHT: 181.2 LBS | BODY MASS INDEX: 29.25 KG/M2 | DIASTOLIC BLOOD PRESSURE: 68 MMHG | SYSTOLIC BLOOD PRESSURE: 118 MMHG

## 2019-01-22 DIAGNOSIS — Z34.92 ENCOUNTER FOR SUPERVISION OF LOW-RISK PREGNANCY IN SECOND TRIMESTER: Primary | ICD-10-CM

## 2019-01-22 DIAGNOSIS — Z3A.16 16 WEEKS GESTATION OF PREGNANCY: ICD-10-CM

## 2019-01-22 DIAGNOSIS — O23.42 UTI (URINARY TRACT INFECTION) DURING PREGNANCY, SECOND TRIMESTER: ICD-10-CM

## 2019-01-22 PROCEDURE — PNV: Performed by: OBSTETRICS & GYNECOLOGY

## 2019-01-22 NOTE — PROGRESS NOTES
Jesenia Cesar is a 35y o  year old  at 16w2d for routine prenatal visit    + FM, no vaginal bleeding, contractions, or LOF  Complaints: Yes itchy rash in the riht upper arm and both legs (upper thigh) unsure the cause but very itchy, advised to try calamine lotion with steroid cream and oatmeal baths  Most recent ultrasound and labs reviewed  Has level II US scheduled on     Urine Cx KIM script provided today due to GBS bacteuria    Part II of SS drawn today     S/p Monistat for yeat infection, symptoms improved

## 2019-01-28 ENCOUNTER — APPOINTMENT (OUTPATIENT)
Dept: LAB | Facility: MEDICAL CENTER | Age: 34
End: 2019-01-28
Payer: COMMERCIAL

## 2019-01-28 PROCEDURE — 87086 URINE CULTURE/COLONY COUNT: CPT | Performed by: OBSTETRICS & GYNECOLOGY

## 2019-01-29 ENCOUNTER — TELEPHONE (OUTPATIENT)
Dept: PERINATAL CARE | Facility: CLINIC | Age: 34
End: 2019-01-29

## 2019-01-29 LAB — BACTERIA UR CULT: NORMAL

## 2019-02-18 ENCOUNTER — ROUTINE PRENATAL (OUTPATIENT)
Dept: PERINATAL CARE | Facility: CLINIC | Age: 34
End: 2019-02-18
Payer: COMMERCIAL

## 2019-02-18 VITALS
DIASTOLIC BLOOD PRESSURE: 74 MMHG | SYSTOLIC BLOOD PRESSURE: 115 MMHG | BODY MASS INDEX: 29.77 KG/M2 | HEART RATE: 96 BPM | HEIGHT: 66 IN | WEIGHT: 185.2 LBS

## 2019-02-18 DIAGNOSIS — Z36.86 ENCOUNTER FOR ANTENATAL SCREENING FOR CERVICAL LENGTH: ICD-10-CM

## 2019-02-18 DIAGNOSIS — Z3A.20 20 WEEKS GESTATION OF PREGNANCY: Primary | ICD-10-CM

## 2019-02-18 DIAGNOSIS — O35.2XX0 PREVIOUS CHILD WITH CONGENITAL ANOMALY, CURRENTLY PREGNANT, ANTEPARTUM, SINGLE OR UNSPECIFIED FETUS: ICD-10-CM

## 2019-02-18 PROCEDURE — 76811 OB US DETAILED SNGL FETUS: CPT | Performed by: OBSTETRICS & GYNECOLOGY

## 2019-02-18 PROCEDURE — 76817 TRANSVAGINAL US OBSTETRIC: CPT | Performed by: OBSTETRICS & GYNECOLOGY

## 2019-02-18 PROCEDURE — 99212 OFFICE O/P EST SF 10 MIN: CPT | Performed by: OBSTETRICS & GYNECOLOGY

## 2019-02-18 NOTE — PROGRESS NOTES
A transvaginal ultrasound was performed  Sonographer note on use of High Level Disinfection Process (Trophon) for transvaginal probe1 used, serial [id number]    Ezio Santiago RDMS

## 2019-02-18 NOTE — LETTER
February 18, 2019     Seng العراقي MD  [address]    Patient: Quentin Clakr   YOB: 1985   Date of Visit: 2/18/2019       Dear Dr Joshua Bobby: Thank you for referring Britton Caller to me for evaluation  Below are my notes for this consultation  If you have questions, please do not hesitate to call me  I look forward to following your patient along with you  Sincerely,        Jojo Cook MD        CC: No Recipients  Jojo Cook MD  2/18/2019  8:45 AM  Sign at close encounter  Please refer to the [facility] ultrasound report in Ob Procedures for additional information regarding the visit to the [facility]  today

## 2019-02-18 NOTE — PROGRESS NOTES
Please refer to the [facility] ultrasound report in Ob Procedures for additional information regarding the visit to the [facility]  today

## 2019-02-21 ENCOUNTER — ROUTINE PRENATAL (OUTPATIENT)
Dept: OBGYN CLINIC | Facility: MEDICAL CENTER | Age: 34
End: 2019-02-21

## 2019-02-21 VITALS — SYSTOLIC BLOOD PRESSURE: 128 MMHG | BODY MASS INDEX: 30.18 KG/M2 | WEIGHT: 187 LBS | DIASTOLIC BLOOD PRESSURE: 80 MMHG

## 2019-02-21 DIAGNOSIS — Z34.92 ENCOUNTER FOR PREGNANCY RELATED EXAMINATION IN SECOND TRIMESTER: Primary | ICD-10-CM

## 2019-02-21 PROCEDURE — PNV: Performed by: OBSTETRICS & GYNECOLOGY

## 2019-02-21 PROCEDURE — 87086 URINE CULTURE/COLONY COUNT: CPT | Performed by: OBSTETRICS & GYNECOLOGY

## 2019-02-21 NOTE — PROGRESS NOTES
Jill Guidry is a 35y o  year old  at 20w4d for routine prenatal visit    + FM, no vaginal bleeding, contractions, or LOF  Complaints: No   Most recent ultrasound and labs reviewed    Urine culture collected  Secondary to mixed contaminants

## 2019-02-24 LAB — BACTERIA UR CULT: NORMAL

## 2019-03-19 ENCOUNTER — ROUTINE PRENATAL (OUTPATIENT)
Dept: OBGYN CLINIC | Facility: MEDICAL CENTER | Age: 34
End: 2019-03-19

## 2019-03-19 VITALS — WEIGHT: 190.3 LBS | BODY MASS INDEX: 30.72 KG/M2 | SYSTOLIC BLOOD PRESSURE: 110 MMHG | DIASTOLIC BLOOD PRESSURE: 72 MMHG

## 2019-03-19 DIAGNOSIS — Z34.92 ENCOUNTER FOR SUPERVISION OF LOW-RISK PREGNANCY IN SECOND TRIMESTER: Primary | ICD-10-CM

## 2019-03-19 DIAGNOSIS — Z3A.24 24 WEEKS GESTATION OF PREGNANCY: ICD-10-CM

## 2019-03-19 PROCEDURE — PNV: Performed by: OBSTETRICS & GYNECOLOGY

## 2019-03-19 NOTE — PROGRESS NOTES
Burton Loco is a 35y o  year old  at 24w2d for routine prenatal visit    + FM, no vaginal bleeding, contractions, or LOF  Complaints: No   Most recent ultrasound and labs reviewed    28 week labs at next visit  Has exacerbation of allergies, saw ENT yesterday; uses Rhinocort

## 2019-04-11 ENCOUNTER — OFFICE VISIT (OUTPATIENT)
Dept: URGENT CARE | Facility: MEDICAL CENTER | Age: 34
End: 2019-04-11
Payer: COMMERCIAL

## 2019-04-11 VITALS
SYSTOLIC BLOOD PRESSURE: 116 MMHG | DIASTOLIC BLOOD PRESSURE: 67 MMHG | WEIGHT: 189 LBS | RESPIRATION RATE: 18 BRPM | TEMPERATURE: 100.4 F | OXYGEN SATURATION: 100 % | BODY MASS INDEX: 30.37 KG/M2 | HEART RATE: 114 BPM | HEIGHT: 66 IN

## 2019-04-11 DIAGNOSIS — J30.9 ALLERGIC RHINITIS, UNSPECIFIED SEASONALITY, UNSPECIFIED TRIGGER: Primary | ICD-10-CM

## 2019-04-11 PROCEDURE — 99213 OFFICE O/P EST LOW 20 MIN: CPT | Performed by: FAMILY MEDICINE

## 2019-04-15 PROBLEM — Z34.93 ENCOUNTER FOR SUPERVISION OF LOW-RISK PREGNANCY IN THIRD TRIMESTER: Status: ACTIVE | Noted: 2019-01-22

## 2019-04-15 PROBLEM — Z3A.28 28 WEEKS GESTATION OF PREGNANCY: Status: ACTIVE | Noted: 2019-01-22

## 2019-04-16 ENCOUNTER — ROUTINE PRENATAL (OUTPATIENT)
Dept: OBGYN CLINIC | Facility: MEDICAL CENTER | Age: 34
End: 2019-04-16
Payer: COMMERCIAL

## 2019-04-16 VITALS — BODY MASS INDEX: 30.83 KG/M2 | SYSTOLIC BLOOD PRESSURE: 104 MMHG | WEIGHT: 191 LBS | DIASTOLIC BLOOD PRESSURE: 66 MMHG

## 2019-04-16 DIAGNOSIS — Z3A.28 28 WEEKS GESTATION OF PREGNANCY: ICD-10-CM

## 2019-04-16 DIAGNOSIS — Z34.93 THIRD TRIMESTER PREGNANCY: Primary | ICD-10-CM

## 2019-04-16 DIAGNOSIS — J01.10 ACUTE NON-RECURRENT FRONTAL SINUSITIS: ICD-10-CM

## 2019-04-16 LAB
BASOPHILS # BLD AUTO: 0.01 THOUSANDS/ΜL (ref 0–0.1)
BASOPHILS NFR BLD AUTO: 0 % (ref 0–1)
EOSINOPHIL # BLD AUTO: 0.06 THOUSAND/ΜL (ref 0–0.61)
EOSINOPHIL NFR BLD AUTO: 1 % (ref 0–6)
ERYTHROCYTE [DISTWIDTH] IN BLOOD BY AUTOMATED COUNT: 12.5 % (ref 11.6–15.1)
GLUCOSE 1H P 50 G GLC PO SERPL-MCNC: 145 MG/DL
HCT VFR BLD AUTO: 29.8 % (ref 34.8–46.1)
HGB BLD-MCNC: 9.5 G/DL (ref 11.5–15.4)
IMM GRANULOCYTES # BLD AUTO: 0.08 THOUSAND/UL (ref 0–0.2)
IMM GRANULOCYTES NFR BLD AUTO: 1 % (ref 0–2)
LYMPHOCYTES # BLD AUTO: 1.93 THOUSANDS/ΜL (ref 0.6–4.47)
LYMPHOCYTES NFR BLD AUTO: 20 % (ref 14–44)
MCH RBC QN AUTO: 29.8 PG (ref 26.8–34.3)
MCHC RBC AUTO-ENTMCNC: 31.9 G/DL (ref 31.4–37.4)
MCV RBC AUTO: 93 FL (ref 82–98)
MONOCYTES # BLD AUTO: 0.44 THOUSAND/ΜL (ref 0.17–1.22)
MONOCYTES NFR BLD AUTO: 5 % (ref 4–12)
NEUTROPHILS # BLD AUTO: 7.28 THOUSANDS/ΜL (ref 1.85–7.62)
NEUTS SEG NFR BLD AUTO: 73 % (ref 43–75)
NRBC BLD AUTO-RTO: 0 /100 WBCS
PLATELET # BLD AUTO: 290 THOUSANDS/UL (ref 149–390)
PMV BLD AUTO: 9.1 FL (ref 8.9–12.7)
RBC # BLD AUTO: 3.19 MILLION/UL (ref 3.81–5.12)
WBC # BLD AUTO: 9.8 THOUSAND/UL (ref 4.31–10.16)

## 2019-04-16 PROCEDURE — PNV: Performed by: OBSTETRICS & GYNECOLOGY

## 2019-04-16 PROCEDURE — 36415 COLL VENOUS BLD VENIPUNCTURE: CPT | Performed by: OBSTETRICS & GYNECOLOGY

## 2019-04-16 PROCEDURE — 85025 COMPLETE CBC W/AUTO DIFF WBC: CPT | Performed by: OBSTETRICS & GYNECOLOGY

## 2019-04-16 PROCEDURE — 82950 GLUCOSE TEST: CPT | Performed by: OBSTETRICS & GYNECOLOGY

## 2019-04-16 RX ORDER — AMOXICILLIN AND CLAVULANATE POTASSIUM 875; 125 MG/1; MG/1
1 TABLET, FILM COATED ORAL EVERY 12 HOURS SCHEDULED
Qty: 14 TABLET | Refills: 0 | Status: SHIPPED | OUTPATIENT
Start: 2019-04-16 | End: 2019-04-23

## 2019-04-17 ENCOUNTER — TELEPHONE (OUTPATIENT)
Dept: OBGYN CLINIC | Facility: MEDICAL CENTER | Age: 34
End: 2019-04-17

## 2019-04-17 DIAGNOSIS — R73.09 ELEVATED GLUCOSE TOLERANCE TEST: Primary | ICD-10-CM

## 2019-04-18 ENCOUNTER — ULTRASOUND (OUTPATIENT)
Dept: PERINATAL CARE | Facility: CLINIC | Age: 34
End: 2019-04-18
Payer: COMMERCIAL

## 2019-04-18 VITALS
HEART RATE: 81 BPM | WEIGHT: 189.9 LBS | BODY MASS INDEX: 30.52 KG/M2 | SYSTOLIC BLOOD PRESSURE: 104 MMHG | DIASTOLIC BLOOD PRESSURE: 67 MMHG | HEIGHT: 66 IN

## 2019-04-18 DIAGNOSIS — Z3A.28 28 WEEKS GESTATION OF PREGNANCY: ICD-10-CM

## 2019-04-18 DIAGNOSIS — O35.2XX0 PREVIOUS CHILD WITH CONGENITAL ANOMALY, CURRENTLY PREGNANT, ANTEPARTUM, SINGLE OR UNSPECIFIED FETUS: Primary | ICD-10-CM

## 2019-04-18 DIAGNOSIS — O99.213 MATERNAL OBESITY, ANTEPARTUM, THIRD TRIMESTER: ICD-10-CM

## 2019-04-18 PROCEDURE — 99212 OFFICE O/P EST SF 10 MIN: CPT | Performed by: OBSTETRICS & GYNECOLOGY

## 2019-04-18 PROCEDURE — 76816 OB US FOLLOW-UP PER FETUS: CPT | Performed by: OBSTETRICS & GYNECOLOGY

## 2019-04-19 ENCOUNTER — APPOINTMENT (OUTPATIENT)
Dept: LAB | Facility: HOSPITAL | Age: 34
End: 2019-04-19
Attending: OBSTETRICS & GYNECOLOGY
Payer: COMMERCIAL

## 2019-04-19 DIAGNOSIS — R73.09 ELEVATED GLUCOSE TOLERANCE TEST: ICD-10-CM

## 2019-04-19 LAB
GLUCOSE 1H P 100 G GLC PO SERPL-MCNC: 139 MG/DL (ref 65–179)
GLUCOSE 2H P 100 G GLC PO SERPL-MCNC: 133 MG/DL (ref 65–154)
GLUCOSE 3H P 100 G GLC PO SERPL-MCNC: 108 MG/DL (ref 65–139)
GLUCOSE P FAST SERPL-MCNC: 84 MG/DL (ref 65–99)

## 2019-04-19 PROCEDURE — 82952 GTT-ADDED SAMPLES: CPT

## 2019-04-19 PROCEDURE — 82951 GLUCOSE TOLERANCE TEST (GTT): CPT

## 2019-04-19 PROCEDURE — 36415 COLL VENOUS BLD VENIPUNCTURE: CPT

## 2019-04-30 ENCOUNTER — ROUTINE PRENATAL (OUTPATIENT)
Dept: OBGYN CLINIC | Facility: MEDICAL CENTER | Age: 34
End: 2019-04-30
Payer: COMMERCIAL

## 2019-04-30 VITALS — WEIGHT: 195.9 LBS | DIASTOLIC BLOOD PRESSURE: 64 MMHG | BODY MASS INDEX: 31.62 KG/M2 | SYSTOLIC BLOOD PRESSURE: 102 MMHG

## 2019-04-30 DIAGNOSIS — Z23 NEED FOR TDAP VACCINATION: ICD-10-CM

## 2019-04-30 DIAGNOSIS — Z3A.30 30 WEEKS GESTATION OF PREGNANCY: ICD-10-CM

## 2019-04-30 DIAGNOSIS — Z34.93 ENCOUNTER FOR SUPERVISION OF LOW-RISK PREGNANCY IN THIRD TRIMESTER: Primary | ICD-10-CM

## 2019-04-30 DIAGNOSIS — Z78.9 BREASTFEEDING (INFANT): ICD-10-CM

## 2019-04-30 PROCEDURE — 90471 IMMUNIZATION ADMIN: CPT | Performed by: OBSTETRICS & GYNECOLOGY

## 2019-04-30 PROCEDURE — 90715 TDAP VACCINE 7 YRS/> IM: CPT | Performed by: OBSTETRICS & GYNECOLOGY

## 2019-04-30 PROCEDURE — PNV: Performed by: OBSTETRICS & GYNECOLOGY

## 2019-04-30 RX ORDER — BREAST PUMP
EACH MISCELLANEOUS ONCE
Qty: 1 EACH | Refills: 0 | Status: SHIPPED | OUTPATIENT
Start: 2019-04-30 | End: 2019-04-30

## 2019-05-14 ENCOUNTER — ROUTINE PRENATAL (OUTPATIENT)
Dept: OBGYN CLINIC | Facility: MEDICAL CENTER | Age: 34
End: 2019-05-14

## 2019-05-14 VITALS — SYSTOLIC BLOOD PRESSURE: 100 MMHG | WEIGHT: 197.6 LBS | BODY MASS INDEX: 31.89 KG/M2 | DIASTOLIC BLOOD PRESSURE: 62 MMHG

## 2019-05-14 DIAGNOSIS — Z34.93 ENCOUNTER FOR SUPERVISION OF LOW-RISK PREGNANCY IN THIRD TRIMESTER: Primary | ICD-10-CM

## 2019-05-14 DIAGNOSIS — Z3A.32 32 WEEKS GESTATION OF PREGNANCY: ICD-10-CM

## 2019-05-14 PROBLEM — Z98.891 HISTORY OF VBAC: Status: ACTIVE | Noted: 2019-05-14

## 2019-05-14 PROBLEM — O34.219 PREVIOUS CESAREAN DELIVERY AFFECTING PREGNANCY: Status: ACTIVE | Noted: 2019-05-14

## 2019-05-14 PROCEDURE — PNV: Performed by: OBSTETRICS & GYNECOLOGY

## 2019-05-14 RX ORDER — FERROUS SULFATE 325(65) MG
65 TABLET ORAL EVERY OTHER DAY
COMMUNITY
End: 2021-10-05

## 2019-05-28 ENCOUNTER — ROUTINE PRENATAL (OUTPATIENT)
Dept: OBGYN CLINIC | Facility: MEDICAL CENTER | Age: 34
End: 2019-05-28

## 2019-05-28 VITALS — BODY MASS INDEX: 32.54 KG/M2 | DIASTOLIC BLOOD PRESSURE: 60 MMHG | SYSTOLIC BLOOD PRESSURE: 103 MMHG | WEIGHT: 201.6 LBS

## 2019-05-28 DIAGNOSIS — Z3A.34 34 WEEKS GESTATION OF PREGNANCY: ICD-10-CM

## 2019-05-28 DIAGNOSIS — Z34.93 ENCOUNTER FOR SUPERVISION OF LOW-RISK PREGNANCY IN THIRD TRIMESTER: Primary | ICD-10-CM

## 2019-05-28 PROCEDURE — PNV: Performed by: OBSTETRICS & GYNECOLOGY

## 2019-06-03 DIAGNOSIS — Z78.9 BREASTFEEDING (INFANT): Primary | ICD-10-CM

## 2019-06-13 ENCOUNTER — ROUTINE PRENATAL (OUTPATIENT)
Dept: OBGYN CLINIC | Facility: MEDICAL CENTER | Age: 34
End: 2019-06-13
Payer: COMMERCIAL

## 2019-06-13 VITALS — WEIGHT: 204.7 LBS | DIASTOLIC BLOOD PRESSURE: 72 MMHG | SYSTOLIC BLOOD PRESSURE: 110 MMHG | BODY MASS INDEX: 33.04 KG/M2

## 2019-06-13 DIAGNOSIS — O34.219 PREVIOUS CESAREAN DELIVERY AFFECTING PREGNANCY: ICD-10-CM

## 2019-06-13 DIAGNOSIS — Z3A.36 36 WEEKS GESTATION OF PREGNANCY: Primary | ICD-10-CM

## 2019-06-13 DIAGNOSIS — O99.013 ANEMIA AFFECTING PREGNANCY IN THIRD TRIMESTER: ICD-10-CM

## 2019-06-13 LAB
BASOPHILS # BLD AUTO: 0.02 THOUSANDS/ΜL (ref 0–0.1)
BASOPHILS NFR BLD AUTO: 0 % (ref 0–1)
EOSINOPHIL # BLD AUTO: 0.06 THOUSAND/ΜL (ref 0–0.61)
EOSINOPHIL NFR BLD AUTO: 1 % (ref 0–6)
ERYTHROCYTE [DISTWIDTH] IN BLOOD BY AUTOMATED COUNT: 13.3 % (ref 11.6–15.1)
HCT VFR BLD AUTO: 35.1 % (ref 34.8–46.1)
HGB BLD-MCNC: 11.1 G/DL (ref 11.5–15.4)
IMM GRANULOCYTES # BLD AUTO: 0.08 THOUSAND/UL (ref 0–0.2)
IMM GRANULOCYTES NFR BLD AUTO: 1 % (ref 0–2)
LYMPHOCYTES # BLD AUTO: 1.95 THOUSANDS/ΜL (ref 0.6–4.47)
LYMPHOCYTES NFR BLD AUTO: 19 % (ref 14–44)
MCH RBC QN AUTO: 29.7 PG (ref 26.8–34.3)
MCHC RBC AUTO-ENTMCNC: 31.6 G/DL (ref 31.4–37.4)
MCV RBC AUTO: 94 FL (ref 82–98)
MONOCYTES # BLD AUTO: 0.71 THOUSAND/ΜL (ref 0.17–1.22)
MONOCYTES NFR BLD AUTO: 7 % (ref 4–12)
NEUTROPHILS # BLD AUTO: 7.72 THOUSANDS/ΜL (ref 1.85–7.62)
NEUTS SEG NFR BLD AUTO: 72 % (ref 43–75)
NRBC BLD AUTO-RTO: 0 /100 WBCS
PLATELET # BLD AUTO: 241 THOUSANDS/UL (ref 149–390)
PMV BLD AUTO: 10 FL (ref 8.9–12.7)
RBC # BLD AUTO: 3.74 MILLION/UL (ref 3.81–5.12)
WBC # BLD AUTO: 10.54 THOUSAND/UL (ref 4.31–10.16)

## 2019-06-13 PROCEDURE — 36415 COLL VENOUS BLD VENIPUNCTURE: CPT | Performed by: OBSTETRICS & GYNECOLOGY

## 2019-06-13 PROCEDURE — 85025 COMPLETE CBC W/AUTO DIFF WBC: CPT | Performed by: OBSTETRICS & GYNECOLOGY

## 2019-06-13 PROCEDURE — 87653 STREP B DNA AMP PROBE: CPT | Performed by: OBSTETRICS & GYNECOLOGY

## 2019-06-13 PROCEDURE — PNV: Performed by: OBSTETRICS & GYNECOLOGY

## 2019-06-16 LAB — GP B STREP DNA SPEC QL NAA+PROBE: ABNORMAL

## 2019-06-20 ENCOUNTER — ULTRASOUND (OUTPATIENT)
Dept: PERINATAL CARE | Facility: OTHER | Age: 34
End: 2019-06-20
Payer: COMMERCIAL

## 2019-06-20 ENCOUNTER — ROUTINE PRENATAL (OUTPATIENT)
Dept: OBGYN CLINIC | Facility: MEDICAL CENTER | Age: 34
End: 2019-06-20

## 2019-06-20 VITALS
HEART RATE: 82 BPM | WEIGHT: 203.8 LBS | BODY MASS INDEX: 32.75 KG/M2 | HEIGHT: 66 IN | DIASTOLIC BLOOD PRESSURE: 73 MMHG | SYSTOLIC BLOOD PRESSURE: 114 MMHG

## 2019-06-20 VITALS — BODY MASS INDEX: 32.89 KG/M2 | SYSTOLIC BLOOD PRESSURE: 118 MMHG | DIASTOLIC BLOOD PRESSURE: 68 MMHG | WEIGHT: 203.8 LBS

## 2019-06-20 DIAGNOSIS — Z98.891 HISTORY OF VBAC: ICD-10-CM

## 2019-06-20 DIAGNOSIS — O35.2XX0 PREVIOUS CHILD WITH CONGENITAL ANOMALY, CURRENTLY PREGNANT, ANTEPARTUM, SINGLE OR UNSPECIFIED FETUS: Primary | ICD-10-CM

## 2019-06-20 DIAGNOSIS — O34.219 PREVIOUS CESAREAN DELIVERY AFFECTING PREGNANCY: ICD-10-CM

## 2019-06-20 DIAGNOSIS — O34.219 PREVIOUS CESAREAN DELIVERY AFFECTING PREGNANCY: Primary | ICD-10-CM

## 2019-06-20 DIAGNOSIS — O36.63X0 EXCESSIVE FETAL GROWTH AFFECTING MANAGEMENT OF PREGNANCY IN THIRD TRIMESTER, SINGLE OR UNSPECIFIED FETUS: ICD-10-CM

## 2019-06-20 DIAGNOSIS — Z3A.37 37 WEEKS GESTATION OF PREGNANCY: ICD-10-CM

## 2019-06-20 PROBLEM — O36.60X0 LGA (LARGE FOR GESTATIONAL AGE) FETUS AFFECTING MANAGEMENT OF MOTHER: Status: ACTIVE | Noted: 2019-06-20

## 2019-06-20 PROCEDURE — PNV: Performed by: OBSTETRICS & GYNECOLOGY

## 2019-06-20 PROCEDURE — 76816 OB US FOLLOW-UP PER FETUS: CPT | Performed by: OBSTETRICS & GYNECOLOGY

## 2019-06-25 ENCOUNTER — HOSPITAL ENCOUNTER (INPATIENT)
Facility: HOSPITAL | Age: 34
LOS: 2 days | Discharge: HOME | End: 2019-06-27
Attending: OBSTETRICS & GYNECOLOGY | Admitting: OBSTETRICS & GYNECOLOGY
Payer: COMMERCIAL

## 2019-06-25 ENCOUNTER — TELEPHONE (OUTPATIENT)
Dept: OBGYN CLINIC | Facility: MEDICAL CENTER | Age: 34
End: 2019-06-25

## 2019-06-25 DIAGNOSIS — Z3A.38 38 WEEKS GESTATION OF PREGNANCY: Primary | ICD-10-CM

## 2019-06-25 LAB
ABO GROUP BLD: NORMAL
BASE EXCESS BLDCOA CALC-SCNC: -1.8 MMOL/L (ref 3–11)
BASE EXCESS BLDCOV CALC-SCNC: -0.7 MMOL/L (ref 1–9)
BASOPHILS # BLD AUTO: 0.01 THOUSANDS/ΜL (ref 0–0.1)
BASOPHILS NFR BLD AUTO: 0 % (ref 0–1)
BLD GP AB SCN SERPL QL: NEGATIVE
EOSINOPHIL # BLD AUTO: 0.03 THOUSAND/ΜL (ref 0–0.61)
EOSINOPHIL NFR BLD AUTO: 0 % (ref 0–6)
ERYTHROCYTE [DISTWIDTH] IN BLOOD BY AUTOMATED COUNT: 13.2 % (ref 11.6–15.1)
HCO3 BLDCOA-SCNC: 23.4 MMOL/L (ref 17.3–27.3)
HCO3 BLDCOV-SCNC: 21.9 MMOL/L (ref 12.2–28.6)
HCT VFR BLD AUTO: 39.9 % (ref 34.8–46.1)
HGB BLD-MCNC: 12.7 G/DL (ref 11.5–15.4)
IMM GRANULOCYTES # BLD AUTO: 0.05 THOUSAND/UL (ref 0–0.2)
IMM GRANULOCYTES NFR BLD AUTO: 1 % (ref 0–2)
LYMPHOCYTES # BLD AUTO: 1.58 THOUSANDS/ΜL (ref 0.6–4.47)
LYMPHOCYTES NFR BLD AUTO: 16 % (ref 14–44)
MCH RBC QN AUTO: 30.2 PG (ref 26.8–34.3)
MCHC RBC AUTO-ENTMCNC: 31.8 G/DL (ref 31.4–37.4)
MCV RBC AUTO: 95 FL (ref 82–98)
MONOCYTES # BLD AUTO: 0.53 THOUSAND/ΜL (ref 0.17–1.22)
MONOCYTES NFR BLD AUTO: 5 % (ref 4–12)
NEUTROPHILS # BLD AUTO: 7.94 THOUSANDS/ΜL (ref 1.85–7.62)
NEUTS SEG NFR BLD AUTO: 78 % (ref 43–75)
NRBC BLD AUTO-RTO: 0 /100 WBCS
O2 CT VFR BLDCOA CALC: 11.8 ML/DL
OXYHGB MFR BLDCOA: 54.6 %
OXYHGB MFR BLDCOV: 83.2 %
PCO2 BLDCOA: 41.5 MM[HG] (ref 30–60)
PCO2 BLDCOV: 30.6 MM HG (ref 27–43)
PH BLDCOA: 7.37 [PH] (ref 7.23–7.43)
PH BLDCOV: 7.47 [PH] (ref 7.19–7.49)
PLATELET # BLD AUTO: 246 THOUSANDS/UL (ref 149–390)
PMV BLD AUTO: 9.7 FL (ref 8.9–12.7)
PO2 BLDCOA: 22.9 MM HG (ref 5–25)
PO2 BLDCOV: 35.9 MM HG (ref 15–45)
RBC # BLD AUTO: 4.2 MILLION/UL (ref 3.81–5.12)
RH BLD: POSITIVE
SAO2 % BLDCOV: 17.3 ML/DL
SPECIMEN EXPIRATION DATE: NORMAL
WBC # BLD AUTO: 10.14 THOUSAND/UL (ref 4.31–10.16)

## 2019-06-25 PROCEDURE — 82805 BLOOD GASES W/O2 SATURATION: CPT | Performed by: OBSTETRICS & GYNECOLOGY

## 2019-06-25 PROCEDURE — 85025 COMPLETE CBC W/AUTO DIFF WBC: CPT | Performed by: OBSTETRICS & GYNECOLOGY

## 2019-06-25 PROCEDURE — 4A1HXCZ MONITORING OF PRODUCTS OF CONCEPTION, CARDIAC RATE, EXTERNAL APPROACH: ICD-10-PCS | Performed by: OBSTETRICS & GYNECOLOGY

## 2019-06-25 PROCEDURE — G0463 HOSPITAL OUTPT CLINIC VISIT: HCPCS

## 2019-06-25 PROCEDURE — 86850 RBC ANTIBODY SCREEN: CPT | Performed by: OBSTETRICS & GYNECOLOGY

## 2019-06-25 PROCEDURE — 0HQ9XZZ REPAIR PERINEUM SKIN, EXTERNAL APPROACH: ICD-10-PCS | Performed by: OBSTETRICS & GYNECOLOGY

## 2019-06-25 PROCEDURE — 99024 POSTOP FOLLOW-UP VISIT: CPT | Performed by: OBSTETRICS & GYNECOLOGY

## 2019-06-25 PROCEDURE — 99202 OFFICE O/P NEW SF 15 MIN: CPT

## 2019-06-25 PROCEDURE — 86901 BLOOD TYPING SEROLOGIC RH(D): CPT | Performed by: OBSTETRICS & GYNECOLOGY

## 2019-06-25 PROCEDURE — 86900 BLOOD TYPING SEROLOGIC ABO: CPT | Performed by: OBSTETRICS & GYNECOLOGY

## 2019-06-25 PROCEDURE — 86592 SYPHILIS TEST NON-TREP QUAL: CPT | Performed by: OBSTETRICS & GYNECOLOGY

## 2019-06-25 RX ORDER — OXYCODONE HYDROCHLORIDE AND ACETAMINOPHEN 5; 325 MG/1; MG/1
2 TABLET ORAL EVERY 4 HOURS PRN
Status: DISCONTINUED | OUTPATIENT
Start: 2019-06-25 | End: 2019-06-27 | Stop reason: HOSPADM

## 2019-06-25 RX ORDER — IBUPROFEN 600 MG/1
600 TABLET ORAL EVERY 6 HOURS PRN
Status: DISCONTINUED | OUTPATIENT
Start: 2019-06-25 | End: 2019-06-27 | Stop reason: HOSPADM

## 2019-06-25 RX ORDER — DOCUSATE SODIUM 100 MG/1
100 CAPSULE, LIQUID FILLED ORAL 2 TIMES DAILY
Status: DISCONTINUED | OUTPATIENT
Start: 2019-06-25 | End: 2019-06-27 | Stop reason: HOSPADM

## 2019-06-25 RX ORDER — OXYTOCIN/RINGER'S LACTATE 30/500 ML
250 PLASTIC BAG, INJECTION (ML) INTRAVENOUS
Status: DISCONTINUED | OUTPATIENT
Start: 2019-06-25 | End: 2019-06-27 | Stop reason: HOSPADM

## 2019-06-25 RX ORDER — DIPHENHYDRAMINE HCL 25 MG
25 TABLET ORAL EVERY 6 HOURS PRN
Status: DISCONTINUED | OUTPATIENT
Start: 2019-06-25 | End: 2019-06-27 | Stop reason: HOSPADM

## 2019-06-25 RX ORDER — OXYCODONE HYDROCHLORIDE AND ACETAMINOPHEN 5; 325 MG/1; MG/1
1 TABLET ORAL EVERY 4 HOURS PRN
Status: DISCONTINUED | OUTPATIENT
Start: 2019-06-25 | End: 2019-06-27 | Stop reason: HOSPADM

## 2019-06-25 RX ORDER — CALCIUM CARBONATE 200(500)MG
1000 TABLET,CHEWABLE ORAL DAILY PRN
Status: DISCONTINUED | OUTPATIENT
Start: 2019-06-25 | End: 2019-06-27 | Stop reason: HOSPADM

## 2019-06-25 RX ORDER — OXYTOCIN/RINGER'S LACTATE 30/500 ML
1-30 PLASTIC BAG, INJECTION (ML) INTRAVENOUS
Status: DISCONTINUED | OUTPATIENT
Start: 2019-06-25 | End: 2019-06-25

## 2019-06-25 RX ORDER — SODIUM CHLORIDE, SODIUM LACTATE, POTASSIUM CHLORIDE, CALCIUM CHLORIDE 600; 310; 30; 20 MG/100ML; MG/100ML; MG/100ML; MG/100ML
125 INJECTION, SOLUTION INTRAVENOUS CONTINUOUS
Status: DISCONTINUED | OUTPATIENT
Start: 2019-06-25 | End: 2019-06-25

## 2019-06-25 RX ORDER — ONDANSETRON 2 MG/ML
4 INJECTION INTRAMUSCULAR; INTRAVENOUS EVERY 8 HOURS PRN
Status: DISCONTINUED | OUTPATIENT
Start: 2019-06-25 | End: 2019-06-27 | Stop reason: HOSPADM

## 2019-06-25 RX ORDER — DIAPER,BRIEF,INFANT-TODD,DISP
1 EACH MISCELLANEOUS AS NEEDED
Status: DISCONTINUED | OUTPATIENT
Start: 2019-06-25 | End: 2019-06-27 | Stop reason: HOSPADM

## 2019-06-25 RX ORDER — ACETAMINOPHEN 325 MG/1
650 TABLET ORAL EVERY 4 HOURS PRN
Status: DISCONTINUED | OUTPATIENT
Start: 2019-06-25 | End: 2019-06-27 | Stop reason: HOSPADM

## 2019-06-25 RX ORDER — BUPIVACAINE HYDROCHLORIDE 2.5 MG/ML
30 INJECTION, SOLUTION EPIDURAL; INFILTRATION; INTRACAUDAL ONCE
Status: COMPLETED | OUTPATIENT
Start: 2019-06-25 | End: 2019-06-25

## 2019-06-25 RX ORDER — BUPIVACAINE HYDROCHLORIDE 2.5 MG/ML
INJECTION, SOLUTION EPIDURAL; INFILTRATION; INTRACAUDAL
Status: COMPLETED
Start: 2019-06-25 | End: 2019-06-25

## 2019-06-25 RX ADMIN — SODIUM CHLORIDE 5 MILLION UNITS: 0.9 INJECTION, SOLUTION INTRAVENOUS at 13:35

## 2019-06-25 RX ADMIN — BENZOCAINE AND LEVOMENTHOL: 200; 5 SPRAY TOPICAL at 20:05

## 2019-06-25 RX ADMIN — WITCH HAZEL 1 PAD: 500 SOLUTION RECTAL; TOPICAL at 20:08

## 2019-06-25 RX ADMIN — SODIUM CHLORIDE 2.5 MILLION UNITS: 9 INJECTION, SOLUTION INTRAVENOUS at 18:05

## 2019-06-25 RX ADMIN — BUPIVACAINE HYDROCHLORIDE 30 ML: 2.5 INJECTION, SOLUTION EPIDURAL; INFILTRATION; INTRACAUDAL at 18:47

## 2019-06-25 RX ADMIN — IBUPROFEN 600 MG: 600 TABLET ORAL at 20:05

## 2019-06-25 RX ADMIN — HYDROCORTISONE 1 APPLICATION: 1 CREAM TOPICAL at 20:08

## 2019-06-25 RX ADMIN — BUPIVACAINE HYDROCHLORIDE 30 ML: 2.5 INJECTION, SOLUTION EPIDURAL; INFILTRATION; INTRACAUDAL; PERINEURAL at 18:47

## 2019-06-25 RX ADMIN — SODIUM CHLORIDE, SODIUM LACTATE, POTASSIUM CHLORIDE, AND CALCIUM CHLORIDE 125 ML/HR: .6; .31; .03; .02 INJECTION, SOLUTION INTRAVENOUS at 13:35

## 2019-06-25 RX ADMIN — Medication 2 MILLI-UNITS/MIN: at 16:07

## 2019-06-26 LAB — RPR SER QL: NORMAL

## 2019-06-26 PROCEDURE — 99024 POSTOP FOLLOW-UP VISIT: CPT | Performed by: OBSTETRICS & GYNECOLOGY

## 2019-06-26 RX ADMIN — DOCUSATE SODIUM 100 MG: 100 CAPSULE, LIQUID FILLED ORAL at 08:16

## 2019-06-26 RX ADMIN — IBUPROFEN 600 MG: 600 TABLET ORAL at 06:27

## 2019-06-26 RX ADMIN — DOCUSATE SODIUM 100 MG: 100 CAPSULE, LIQUID FILLED ORAL at 18:42

## 2019-06-27 VITALS
SYSTOLIC BLOOD PRESSURE: 103 MMHG | DIASTOLIC BLOOD PRESSURE: 70 MMHG | TEMPERATURE: 98 F | BODY MASS INDEX: 32.62 KG/M2 | OXYGEN SATURATION: 97 % | HEIGHT: 66 IN | WEIGHT: 203 LBS | HEART RATE: 72 BPM | RESPIRATION RATE: 16 BRPM

## 2019-06-27 PROCEDURE — 99024 POSTOP FOLLOW-UP VISIT: CPT | Performed by: OBSTETRICS & GYNECOLOGY

## 2019-06-27 RX ORDER — DOCUSATE SODIUM 100 MG/1
100 CAPSULE, LIQUID FILLED ORAL 2 TIMES DAILY
Qty: 10 CAPSULE | Refills: 0 | Status: SHIPPED | OUTPATIENT
Start: 2019-06-27 | End: 2021-10-05

## 2019-06-27 RX ORDER — ACETAMINOPHEN 325 MG/1
650 TABLET ORAL EVERY 4 HOURS PRN
Qty: 30 TABLET | Refills: 0
Start: 2019-06-27 | End: 2021-10-05

## 2019-06-27 RX ORDER — IBUPROFEN 600 MG/1
600 TABLET ORAL EVERY 6 HOURS PRN
Qty: 30 TABLET | Refills: 0 | Status: SHIPPED | OUTPATIENT
Start: 2019-06-27 | End: 2021-10-05

## 2019-06-27 RX ORDER — DIPHENHYDRAMINE HCL 25 MG
25 TABLET ORAL EVERY 6 HOURS PRN
Qty: 30 TABLET | Refills: 0
Start: 2019-06-27 | End: 2021-10-05

## 2019-06-27 RX ADMIN — BENZOCAINE AND LEVOMENTHOL: 200; 5 SPRAY TOPICAL at 09:33

## 2019-06-27 RX ADMIN — DOCUSATE SODIUM 100 MG: 100 CAPSULE, LIQUID FILLED ORAL at 08:20

## 2019-07-03 LAB — PLACENTA IN STORAGE: NORMAL

## 2019-07-08 ENCOUNTER — OFFICE VISIT (OUTPATIENT)
Dept: OBGYN CLINIC | Facility: CLINIC | Age: 34
End: 2019-07-08

## 2019-07-08 ENCOUNTER — TELEPHONE (OUTPATIENT)
Dept: OBGYN CLINIC | Facility: MEDICAL CENTER | Age: 34
End: 2019-07-08

## 2019-07-08 VITALS
BODY MASS INDEX: 29.41 KG/M2 | DIASTOLIC BLOOD PRESSURE: 66 MMHG | HEIGHT: 66 IN | WEIGHT: 183 LBS | SYSTOLIC BLOOD PRESSURE: 120 MMHG

## 2019-07-08 DIAGNOSIS — N60.02 CYST OF LEFT NIPPLE: Primary | ICD-10-CM

## 2019-07-08 PROCEDURE — 99024 POSTOP FOLLOW-UP VISIT: CPT | Performed by: OBSTETRICS & GYNECOLOGY

## 2019-07-08 RX ORDER — MAG HYDROX/ALUMINUM HYD/SIMETH 400-400-40
SUSPENSION, ORAL (FINAL DOSE FORM) ORAL
COMMUNITY
End: 2021-10-05

## 2019-07-08 NOTE — PROGRESS NOTES
Assessment There are no diagnoses linked to this encounter  Plan  Will consider discussing with lactation consultant at Pediatricians office  Prescribed APNO ointment  Discussed using nipple shield to protect the area    Subjective   Lianne Cronin is a 35 y o  female here for a problem visit  Patient is complaining of left nipple cyst rupture and pain  Sx's started 2 days ago  Delivered 19 and has been exclusively breastfeeding  states the lump started draining today, was draining yellow/bloody fluid  No fever, chills, redness of breast tissue  Had her nipples pierced in the past  Did not have this issue with her last, but she only pumped  Patient Active Problem List   Diagnosis     (spontaneous vaginal delivery)    Encounter for supervision of low-risk pregnancy in third trimester    Previous child with congenital anomaly, currently pregnant, antepartum    Previous  delivery affecting pregnancy    History of     LGA (large for gestational age) fetus affecting management of mother       Gynecologic History  Patient's last menstrual period was 2018 (exact date)  The current method of family planning is none      Past Medical History:   Diagnosis Date    Anemia     during current pregnancy    Asthma     exercise induced asthma - dx as kid    Varicella      Past Surgical History:   Procedure Laterality Date     SECTION      TONSILECTOMY AND ADNOIDECTOMY      WISDOM TOOTH EXTRACTION       Family History   Problem Relation Age of Onset    Ovarian cysts Mother     Hypertension Father     Asthma Brother     Cancer Maternal Grandmother     Lung cancer Maternal Grandfather     Hypertension Paternal Grandmother     No Known Problems Paternal Grandfather      Social History     Socioeconomic History    Marital status: /Civil Union     Spouse name: Not on file    Number of children: Not on file    Years of education: Not on file    Highest education level: Not on file   Occupational History    Not on file   Social Needs    Financial resource strain: Not on file    Food insecurity:     Worry: Not on file     Inability: Not on file    Transportation needs:     Medical: Not on file     Non-medical: Not on file   Tobacco Use    Smoking status: Former Smoker    Smokeless tobacco: Never Used    Tobacco comment: social   Substance and Sexual Activity    Alcohol use: Not Currently     Comment: socially prior to knowledge of pregnancy    Drug use: No    Sexual activity: Yes     Partners: Male   Lifestyle    Physical activity:     Days per week: Not on file     Minutes per session: Not on file    Stress: Not on file   Relationships    Social connections:     Talks on phone: Not on file     Gets together: Not on file     Attends Latter day service: Not on file     Active member of club or organization: Not on file     Attends meetings of clubs or organizations: Not on file     Relationship status: Not on file    Intimate partner violence:     Fear of current or ex partner: Not on file     Emotionally abused: Not on file     Physically abused: Not on file     Forced sexual activity: Not on file   Other Topics Concern    Not on file   Social History Narrative    Not on file     Allergies   Allergen Reactions    Cholestatin     Dairy Aid [Lactase] GI Intolerance    Isoflavones Abdominal Pain    Nickel Other (See Comments)     RASH    Peanut Oil Anaphylaxis    Molds & Smuts Abdominal Pain    Other Other (See Comments)     Annotation - [id number]: mangos    Shellfish-Derived Products Abdominal Pain       Current Outpatient Medications:     Cholecalciferol (VITAMIN D3) 5000 units CAPS, Take by mouth, Disp: , Rfl:     acetaminophen (TYLENOL) 325 mg tablet, Take 2 tablets (650 mg total) by mouth every 4 (four) hours as needed for mild pain, Disp: 30 tablet, Rfl: 0    benzocaine-menthol-lanolin-aloe (DERMOPLAST) 20-0 5 % topical spray, Apply 1 application topically 4 (four) times a day as needed for mild pain, Disp: , Rfl: 0    budesonide (RINOCORT AQUA) 32 MCG/ACT nasal spray, 1 spray into each nostril daily, Disp: , Rfl:     cetirizine (ZYRTEC ALLERGY) 10 mg tablet, Take by mouth, Disp: , Rfl:     diphenhydrAMINE (BENADRYL) 25 mg tablet, Take 1 tablet (25 mg total) by mouth every 6 (six) hours as needed for itching (Itching), Disp: 30 tablet, Rfl: 0    docusate sodium (COLACE) 100 mg capsule, Take 1 capsule (100 mg total) by mouth 2 (two) times a day, Disp: 10 capsule, Rfl: 0    ferrous sulfate 325 (65 Fe) mg tablet, Take 65 mg by mouth every other day, Disp: , Rfl:     ibuprofen (MOTRIN) 600 mg tablet, Take 1 tablet (600 mg total) by mouth every 6 (six) hours as needed (cramping), Disp: 30 tablet, Rfl: 0    Prenatal MV-Min-Fe Fum-FA-DHA (PRENATAL 1 PO), Take 1 tablet by mouth daily, Disp: , Rfl:     witch hazel-glycerin (TUCKS) topical pad, Apply 1 pad topically as needed for irritation, Disp: , Rfl: 0    Review of Systems  Constitutional :no fever, feels well, no tiredness, no recent weight gain or loss  ENT: no ear ache, no loss of hearing, no nosebleeds or nasal discharge, no sore throat or hoarseness  Cardiovascular: no complaints of slow or fast heart beat, no chest pain, no palpitations, no leg claudication or lower extremity edema  Respiratory: no complaints of shortness of shortness of breath, no HENDERSON  Breasts:no complaints of breast pain, breast lump, or nipple discharge  Gastrointestinal: no complaints of abdominal pain, constipation, nausea, vomiting, or diarrhea or bloody stools  Genitourinary : no complaints of dysuria, incontinence, pelvic pain, no dysmenorrhea, vaginal discharge or abnormal vaginal bleeding and as noted in HPI  Musculoskeletal: no complaints of arthralgia, no myalgia, no joint swelling or stiffness, no limb pain or swelling    Integumentary: no complaints of skin rash or lesion, itching or dry skin  Neurological: no complaints of headache, no confusion, no numbness or tingling, no dizziness or fainting     Objective     /66   Ht 5' 6" (1 676 m)   Wt 83 kg (183 lb)   LMP 09/30/2018 (Exact Date)   Breastfeeding? Yes   BMI 29 54 kg/m²     General:   appears stated age, cooperative, alert normal mood and affect   Neck: normal, supple,trachea midline, no masses   Heart: regular rate and rhythm, S1, S2 normal, no murmur, click, rub or gallop   Lungs: clear to auscultation bilaterally   Breasts: normal appearance, no masses or tenderness, left nipple at 6:00 area of drainage      Abdomen: soft, non-tender, without masses or organomegaly

## 2019-07-08 NOTE — TELEPHONE ENCOUNTER
States she had prior cyst in nipple/aerola of breast   Is currently breast feeding  Cyst became larger and ruptured over weekend  States it was draining blood and purulent material   Denies fever or chills  Appt   Given to evaluate

## 2019-08-07 ENCOUNTER — POSTPARTUM VISIT (OUTPATIENT)
Dept: OBGYN CLINIC | Facility: MEDICAL CENTER | Age: 34
End: 2019-08-07

## 2019-08-07 VITALS — DIASTOLIC BLOOD PRESSURE: 68 MMHG | SYSTOLIC BLOOD PRESSURE: 118 MMHG | WEIGHT: 183.1 LBS | BODY MASS INDEX: 29.55 KG/M2

## 2019-08-07 PROBLEM — O35.2XX0 PREVIOUS CHILD WITH CONGENITAL ANOMALY, CURRENTLY PREGNANT, ANTEPARTUM: Status: RESOLVED | Noted: 2019-02-18 | Resolved: 2019-08-07

## 2019-08-07 PROBLEM — O36.60X0 LGA (LARGE FOR GESTATIONAL AGE) FETUS AFFECTING MANAGEMENT OF MOTHER: Status: RESOLVED | Noted: 2019-06-20 | Resolved: 2019-08-07

## 2019-08-07 PROBLEM — O34.219 PREVIOUS CESAREAN DELIVERY AFFECTING PREGNANCY: Status: RESOLVED | Noted: 2019-05-14 | Resolved: 2019-08-07

## 2019-08-07 PROBLEM — Z34.93 ENCOUNTER FOR SUPERVISION OF LOW-RISK PREGNANCY IN THIRD TRIMESTER: Status: RESOLVED | Noted: 2019-01-22 | Resolved: 2019-08-07

## 2019-08-07 PROBLEM — Z98.891 HISTORY OF VBAC: Status: RESOLVED | Noted: 2019-05-14 | Resolved: 2019-08-07

## 2019-08-07 PROCEDURE — 99024 POSTOP FOLLOW-UP VISIT: CPT | Performed by: OBSTETRICS & GYNECOLOGY

## 2019-08-07 NOTE — PROGRESS NOTES
Gelacio Tate is a 35 y o  y o  female [id number] who presents for a postpartum visit  She is 6 weeks postpartum following a  on   I have fully reviewed the prenatal and intrapartum course  The delivery was at 38 2 gestational weeks  Outcome: vaginal birth after  ()  Anesthesia: none  Postpartum course has been uncomplicated  Baby's course has been uncomplicated  Baby is feeding by breast  Bleeding no bleeding  Bowel function is normal  Bladder function is normal  Patient is not sexually active  Contraception method is condoms  Postpartum depression screening: negative  The following portions of the patient's history were reviewed and updated as appropriate: allergies, current medications, past family history, past medical history, past social history, past surgical history and problem list     Review of Systems  Pertinent items are noted in HPI       Objective     /64   Wt 59 4 kg (131 lb)   BMI 22 49 kg/m²    General:   appears stated age, cooperative, alert normal mood and affect   Neck: Neck: normal, supple,trachea midline, no masses   Heart: regular rate and rhythm, S1, S2 normal, no murmur, click, rub or gallop   Lungs: clear to auscultation bilaterally   Breasts: Deferred, breastfeeding   Abdomen: soft, non-tender, without masses or organomegaly   Vulva: normal, normal female genitalia, Bartholin's, Urethra, [place] normal, no lesions, normal female hair distribution   Vagina: normal vagina, no discharge, exudate, lesion, or erythema   Urethra: normal   Cervix: Normal, no discharge  Uterus: normal size, contour, position, consistency, mobility, non-tender   Adnexa: normal adnexa     Assessment/Plan     Routine postpartum exam  Pap smear not done at today's visit  1  Contraception: condoms  2  Pap due   3  Follow up in: 1 year or as needed

## 2020-08-12 ENCOUNTER — ANNUAL EXAM (OUTPATIENT)
Dept: OBGYN CLINIC | Facility: MEDICAL CENTER | Age: 35
End: 2020-08-12
Payer: COMMERCIAL

## 2020-08-12 VITALS
WEIGHT: 176.6 LBS | SYSTOLIC BLOOD PRESSURE: 120 MMHG | TEMPERATURE: 98.7 F | BODY MASS INDEX: 28.5 KG/M2 | DIASTOLIC BLOOD PRESSURE: 60 MMHG

## 2020-08-12 DIAGNOSIS — Z01.419 ENCOUNTER FOR WELL WOMAN EXAM WITH ROUTINE GYNECOLOGICAL EXAM: Primary | ICD-10-CM

## 2020-08-12 PROCEDURE — G0145 SCR C/V CYTO,THINLAYER,RESCR: HCPCS | Performed by: OBSTETRICS & GYNECOLOGY

## 2020-08-12 PROCEDURE — 87624 HPV HI-RISK TYP POOLED RSLT: CPT | Performed by: OBSTETRICS & GYNECOLOGY

## 2020-08-12 PROCEDURE — S0612 ANNUAL GYNECOLOGICAL EXAMINA: HCPCS | Performed by: OBSTETRICS & GYNECOLOGY

## 2020-08-12 NOTE — PROGRESS NOTES
ASSESSMENT & PLAN: Kaleb Marina is a 29 y o  [id number] with normal gynecologic exam     1   Routine well woman exam done today  2  Pap and HPV:  The patient's last pap and hpv was   It was ASCUS, HPV negative  Pap and cotesting was done today  Current ASCCP Guidelines reviewed  3   The following were reviewed in today's visit: breast self exam   4  Contraception: Vasectomy    CC:  Annual Gynecologic Examination    HPI: Kaleb Marina is a 29 y o  [id number] who presents for annual gynecologic examination  She has the following concerns:  No GYN complaints; recently stopped breastfeeding her daughter    Health Maintenance:    She wears her seatbelt routinely  She does perform regular monthly self breast exams  She feels safe at home  Past Medical History:   Diagnosis Date    Anemia     during current pregnancy    Asthma     exercise induced asthma - dx as kid    Varicella        Past Surgical History:   Procedure Laterality Date     SECTION      TONSILECTOMY AND ADNOIDECTOMY      WISDOM TOOTH EXTRACTION         Past OB/Gyn History:  OB History        5    Para   3    Term   3       0    AB   2    Living   3       SAB   2    TAB        Ectopic        Multiple   0    Live Births   3               Pt does not have menstrual issues  History of sexually transmitted infection: No   History of abnormal pap smears: ASCUS, no prior treatment  Patient is currently sexually active  heterosexual   The current method of family planning is vasectomy      Family History   Problem Relation Age of Onset    Ovarian cysts Mother     Hypertension Father     Asthma Brother     Cancer Maternal Grandmother     Lung cancer Maternal Grandfather     Hypertension Paternal Grandmother     No Known Problems Paternal Grandfather        Social History:  Social History     Socioeconomic History    Marital status: /Civil Union     Spouse name: Not on file    Number of children: Not on file    Years of education: Not on file    Highest education level: Not on file   Occupational History    Not on file   Social Needs    Financial resource strain: Not on file    Food insecurity     Worry: Not on file     Inability: Not on file    Transportation needs     Medical: Not on file     Non-medical: Not on file   Tobacco Use    Smoking status: Former Smoker    Smokeless tobacco: Never Used    Tobacco comment: social   Substance and Sexual Activity    Alcohol use: Yes     Frequency: 2-4 times a month    Drug use: No    Sexual activity: Yes     Partners: Male     Birth control/protection: Male Sterilization   Lifestyle    Physical activity     Days per week: Not on file     Minutes per session: Not on file    Stress: Not on file   Relationships    Social connections     Talks on phone: Not on file     Gets together: Not on file     Attends Oriental orthodox service: Not on file     Active member of club or organization: Not on file     Attends meetings of clubs or organizations: Not on file     Relationship status: Not on file    Intimate partner violence     Fear of current or ex partner: Not on file     Emotionally abused: Not on file     Physically abused: Not on file     Forced sexual activity: Not on file   Other Topics Concern    Not on file   Social History Narrative    Not on file          Allergies   Allergen Reactions    Cholestatin     Dairy Aid [Lactase] GI Intolerance    Nickel Other (See Comments)     RASH    Peanut Oil Anaphylaxis    Soy Isoflavones Abdominal Pain    Molds & Smuts Abdominal Pain    Other Other (See Comments)     Annotation - [id number]: mangos    Shellfish-Derived Products Abdominal Pain         Current Outpatient Medications:     budesonide (RINOCORT AQUA) 32 MCG/ACT nasal spray, 1 spray into each nostril daily, Disp: , Rfl:     cetirizine (ZYRTEC ALLERGY) 10 mg tablet, Take by mouth, Disp: , Rfl:     Cholecalciferol (VITAMIN D3) 5000 units CAPS, Take by mouth, Disp: , Rfl:     acetaminophen (TYLENOL) 325 mg tablet, Take 2 tablets (650 mg total) by mouth every 4 (four) hours as needed for mild pain (Patient not taking: Reported on 8/7/2019), Disp: 30 tablet, Rfl: 0    benzocaine-menthol-lanolin-aloe (DERMOPLAST) 20-0 5 % topical spray, Apply 1 application topically 4 (four) times a day as needed for mild pain (Patient not taking: Reported on 8/7/2019), Disp: , Rfl: 0    diphenhydrAMINE (BENADRYL) 25 mg tablet, Take 1 tablet (25 mg total) by mouth every 6 (six) hours as needed for itching (Itching) (Patient not taking: Reported on 8/7/2019), Disp: 30 tablet, Rfl: 0    docusate sodium (COLACE) 100 mg capsule, Take 1 capsule (100 mg total) by mouth 2 (two) times a day (Patient not taking: Reported on 8/7/2019), Disp: 10 capsule, Rfl: 0    ferrous sulfate 325 (65 Fe) mg tablet, Take 65 mg by mouth every other day, Disp: , Rfl:     ibuprofen (MOTRIN) 600 mg tablet, Take 1 tablet (600 mg total) by mouth every 6 (six) hours as needed (cramping) (Patient not taking: Reported on 8/7/2019), Disp: 30 tablet, Rfl: 0    Prenatal MV-Min-Fe Fum-FA-DHA (PRENATAL 1 PO), Take 1 tablet by mouth daily, Disp: , Rfl:     witch hazel-glycerin (TUCKS) topical pad, Apply 1 pad topically as needed for irritation (Patient not taking: Reported on 8/7/2019), Disp: , Rfl: 0      Review of Systems  Constitutional :no fever, feels well, no tiredness, no recent weight gain or loss  ENT: no ear ache, no loss of hearing, no nosebleeds or nasal discharge, no sore throat or hoarseness  Cardiovascular: no complaints of slow or fast heart beat, no chest pain, no palpitations, no leg claudication or lower extremity edema    Respiratory: no complaints of shortness of shortness of breath, no HENDERSON  Breasts:no complaints of breast pain, breast lump, or nipple discharge  Gastrointestinal: no complaints of abdominal pain, constipation, nausea, vomiting, or diarrhea or bloody stools  Genitourinary : no complaints of dysuria, incontinence, pelvic pain, no dysmenorrhea, vaginal discharge or abnormal vaginal bleeding and as noted in HPI  Musculoskeletal: no complaints of arthralgia, no myalgia, no joint swelling or stiffness, no limb pain or swelling  Integumentary: no complaints of skin rash or lesion, itching or dry skin  Neurological: no complaints of headache, no confusion, no numbness or tingling, no dizziness or fainting    Objective      /60   Temp 98 7 °F (37 1 °C)   Wt 80 1 kg (176 lb 9 6 oz)   LMP 07/27/2020   BMI 28 50 kg/m²   General:   appears stated age, cooperative, alert normal mood and affect   Neck: normal, supple,trachea midline, no masses   Heart: regular rate and rhythm, S1, S2 normal, no murmur, click, rub or gallop   Lungs: clear to auscultation bilaterally   Breasts: normal appearance, no masses or tenderness, Inspection negative, No nipple retraction or dimpling, No nipple discharge or bleeding, No axillary or supraclavicular adenopathy, Normal to palpation without dominant masses   Abdomen: soft, non-tender, without masses or organomegaly   Vulva: normal female genitalia, Bartholin's, Urethra, [place] normal, no lesions, normal female hair distribution, no clitoral enlargement   Vagina: normal vagina, no discharge, exudate, lesion, or erythema   Urethra: normal   Cervix: Normal, no discharge  PAP done  Uterus: normal size, contour, position, consistency, mobility, non-tender   Adnexa: normal adnexa and no mass, fullness, tenderness   Lymphatic palpation of lymph nodes in neck, axilla, groin and/or other locations: no lymphadenopathy or masses noted   Skin normal skin turgor and no rashes     Psychiatric orientation to person, place, and time: normal  mood and affect: normal

## 2020-08-13 DIAGNOSIS — Z84.2: Primary | ICD-10-CM

## 2020-08-13 LAB
HPV HR 12 DNA CVX QL NAA+PROBE: NEGATIVE
HPV16 DNA CVX QL NAA+PROBE: NEGATIVE
HPV18 DNA CVX QL NAA+PROBE: NEGATIVE

## 2020-08-14 LAB
LAB AP GYN PRIMARY INTERPRETATION: NORMAL
Lab: NORMAL

## 2020-08-20 ENCOUNTER — TELEPHONE (OUTPATIENT)
Dept: OBGYN CLINIC | Facility: MEDICAL CENTER | Age: 35
End: 2020-08-20

## 2020-08-20 NOTE — TELEPHONE ENCOUNTER
----- Message from Amanda Kirk sent at 8/20/2020 11:49 AM EDT -----  I contacted pt's insurance  Effective 7/1/2011  They will covered 1 routine mammo every year even though pt is not 40  Dulce Mariaramos Raglandrodriguez  Ref# [id number]  Pt is aware  Order placed already  Will call to schedule it          ----- Message -----  From: Jama Morse MD  Sent: 8/12/2020   9:10 AM EDT  To: Amanda Kirk    Patient has a strong family history of breast cysts  Wondering if a mammo would be covered  Can we check with her insurance?

## 2021-02-16 ENCOUNTER — DOCUMENTATION (OUTPATIENT)
Dept: OBGYN CLINIC | Facility: MEDICAL CENTER | Age: 36
End: 2021-02-16

## 2021-02-25 ENCOUNTER — OFFICE VISIT (OUTPATIENT)
Dept: OBGYN CLINIC | Facility: CLINIC | Age: 36
End: 2021-02-25
Payer: COMMERCIAL

## 2021-02-25 ENCOUNTER — TELEPHONE (OUTPATIENT)
Dept: HEMATOLOGY ONCOLOGY | Facility: CLINIC | Age: 36
End: 2021-02-25

## 2021-02-25 VITALS — SYSTOLIC BLOOD PRESSURE: 130 MMHG | WEIGHT: 185.8 LBS | DIASTOLIC BLOOD PRESSURE: 70 MMHG | BODY MASS INDEX: 29.99 KG/M2

## 2021-02-25 DIAGNOSIS — N60.02 BREAST CYST, LEFT: ICD-10-CM

## 2021-02-25 DIAGNOSIS — N64.4 BREAST PAIN: Primary | ICD-10-CM

## 2021-02-25 PROCEDURE — 99214 OFFICE O/P EST MOD 30 MIN: CPT | Performed by: OBSTETRICS & GYNECOLOGY

## 2021-02-25 NOTE — TELEPHONE ENCOUNTER
New Patient Breast Form   Patient Details: Leah Sargent     1985     [id number]     Background Information:   [address] starts by opening a telephone encounter and gathering the following information   Who is calling to schedule and relationship?  self   Referring Provider Dr Krystal Granados   To which speciality is the referral?  Surgical Oncology   Reason for Visit? New Diagnosis   Tumor Type? Breast Pain/Breast Cyst Left   Is there a confimed diagnosis from biopsy/tissue reviewed by Pathology? No   Date of Tissue Diagnosis (If done outside of [facility] please obtain report and slides) na   Is patient aware of diagnosis, and who made them aware? Yes   If no tissue diagnosis, please stop and discuss with Navigator prior to scheduling   When was the diagnosis made? 2/25/21   Were outside slides requested  No   If biopsy done externally, obtain reports and slides for internal review   Have you had any imaging or labs done? Yes   If YES, when and  where was the blood work done? [facility] - 3/5   If outside of [facility] obtain records   Was the patient told to bring a disk? no   Are records in EPIC? yes   Is there a personal history of cancer? no   If YES please list type and YR diagnosed    If patient has a prior history of breast cancer were old records obtained? na   Have you ever had genetic testing done for breast cancer? If so, can you please bring a copy to appointment for timely treatment planning No   Is there a family history of cancer? yes   If YES please list type unknown   Does the patient smoke or Vape? no   If yes, how many packs or cartridges per day? Scheduling Information:   [facility]   Are there any days the patient cannot be seen? Miscellaneous:   After completing the above information, please route to finance, nurse navigation and clinical trials for review

## 2021-02-25 NOTE — PROGRESS NOTES
[facility] [address], [address], [place], [place]    Assessment/Plan:  No problem-specific Assessment & Plan notes found for this encounter  Diagnoses and all orders for this visit:    Breast pain  -     Mammo diagnostic left w 3d & cad; Future  -     US breast left limited (diagnostic); Future  -     Ambulatory referral to General Surgery; Future    Breast cyst, left  -     Mammo diagnostic left w 3d & cad; Future  -     US breast left limited (diagnostic); Future  -     Ambulatory referral to General Surgery; Future          Subjective: Hanny Ball is a 28 y o  [id number] female  CC: ruptured breast cyst    HPI: HPI  Patient presents with compliants of left breast cyst  She states it drained about 1 week ago  She also reported pain radiating to the left axilla  Denies masses, dimpling, or redness  Patient has had this issue since her last pregnancy  This most recent occurrence was unprovoked  ROS: Review of Systems   Constitutional: Negative  HENT: Negative  Eyes: Negative  Respiratory: Negative  Cardiovascular: Negative  Gastrointestinal: Negative  Genitourinary: Negative  Musculoskeletal: Negative  All other systems reviewed and are negative  PFSH: The following portions of the patient's history were reviewed and updated as appropriate: allergies, current medications, past family history, past medical history, obstetric history, gynecologic history, past social history, past surgical history and problem list        Objective:  /70   Wt 84 3 kg (185 lb 12 8 oz)   LMP 02/21/2021   BMI 29 99 kg/m²    Physical Exam  Vitals signs reviewed  Constitutional:       Appearance: Normal appearance  Cardiovascular:      Rate and Rhythm: Normal rate  Pulmonary:      Effort: Pulmonary effort is normal  No respiratory distress  Chest:      Breasts:         Right: Normal  No mass, nipple discharge, skin change or tenderness           Left: No mass, nipple discharge, skin change or tenderness  Neurological:      Mental Status: She is alert     Psychiatric:         Mood and Affect: Mood normal          Behavior: Behavior normal

## 2021-03-05 ENCOUNTER — HOSPITAL ENCOUNTER (OUTPATIENT)
Dept: MAMMOGRAPHY | Facility: CLINIC | Age: 36
Discharge: HOME | End: 2021-03-05
Payer: COMMERCIAL

## 2021-03-05 ENCOUNTER — HOSPITAL ENCOUNTER (OUTPATIENT)
Dept: ULTRASOUND IMAGING | Facility: CLINIC | Age: 36
Discharge: HOME | End: 2021-03-05
Payer: COMMERCIAL

## 2021-03-05 VITALS — WEIGHT: 185 LBS | HEIGHT: 66 IN | BODY MASS INDEX: 29.73 KG/M2

## 2021-03-05 DIAGNOSIS — N64.4 BREAST PAIN: ICD-10-CM

## 2021-03-05 DIAGNOSIS — N60.02 BREAST CYST, LEFT: ICD-10-CM

## 2021-03-05 PROCEDURE — G0279 TOMOSYNTHESIS, MAMMO: HCPCS

## 2021-03-05 PROCEDURE — 76642 ULTRASOUND BREAST LIMITED: CPT

## 2021-03-05 PROCEDURE — 77066 DX MAMMO INCL CAD BI: CPT

## 2021-10-05 ENCOUNTER — ANNUAL EXAM (OUTPATIENT)
Dept: OBGYN CLINIC | Facility: CLINIC | Age: 36
End: 2021-10-05
Payer: COMMERCIAL

## 2021-10-05 VITALS
HEIGHT: 66 IN | SYSTOLIC BLOOD PRESSURE: 120 MMHG | DIASTOLIC BLOOD PRESSURE: 80 MMHG | WEIGHT: 185 LBS | BODY MASS INDEX: 29.73 KG/M2

## 2021-10-05 DIAGNOSIS — Z01.419 WELL WOMAN EXAM WITH ROUTINE GYNECOLOGICAL EXAM: Primary | ICD-10-CM

## 2021-10-05 PROCEDURE — S0612 ANNUAL GYNECOLOGICAL EXAMINA: HCPCS | Performed by: NURSE PRACTITIONER

## 2021-10-05 RX ORDER — FLUTICASONE PROPIONATE 50 MCG
SPRAY, SUSPENSION (ML) NASAL
COMMUNITY
Start: 2021-10-05

## 2022-04-22 NOTE — TELEPHONE ENCOUNTER
----- Message from Regino Ferrarokalgiuseppe Cm sent at 12/27/2018  8:38 AM EST -----      ----- Message -----  From: ISACC Wilson Asa  Sent: 12/26/2018   8:50 PM  To: Getachew Rizo RN    Notify that affirm + for yeast, have her use otc monistat cream for 7 days, or if she wants rx, pend rx for fluconazole 200mg to me  Spontaneous, unlabored and symmetrical

## 2024-01-15 ENCOUNTER — OFFICE VISIT (OUTPATIENT)
Dept: OBGYN CLINIC | Facility: MEDICAL CENTER | Age: 39
End: 2024-01-15
Payer: COMMERCIAL

## 2024-01-15 VITALS
WEIGHT: 165 LBS | DIASTOLIC BLOOD PRESSURE: 74 MMHG | SYSTOLIC BLOOD PRESSURE: 110 MMHG | BODY MASS INDEX: 26.52 KG/M2 | HEIGHT: 66 IN

## 2024-01-15 DIAGNOSIS — Z01.419 WELL WOMAN EXAM WITH ROUTINE GYNECOLOGICAL EXAM: Primary | ICD-10-CM

## 2024-01-15 DIAGNOSIS — N83.202 LEFT OVARIAN CYST: ICD-10-CM

## 2024-01-15 PROCEDURE — G0145 SCR C/V CYTO,THINLAYER,RESCR: HCPCS | Performed by: OBSTETRICS & GYNECOLOGY

## 2024-01-15 PROCEDURE — S0612 ANNUAL GYNECOLOGICAL EXAMINA: HCPCS | Performed by: OBSTETRICS & GYNECOLOGY

## 2024-01-15 PROCEDURE — G0476 HPV COMBO ASSAY CA SCREEN: HCPCS | Performed by: OBSTETRICS & GYNECOLOGY

## 2024-01-15 RX ORDER — MAGNESIUM GLUCONATE 30 MG(550)
30 TABLET ORAL 2 TIMES DAILY
COMMUNITY
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
## 2024-01-22 LAB
LAB AP GYN PRIMARY INTERPRETATION: NORMAL
Lab: NORMAL

## 2024-02-26 ENCOUNTER — HOSPITAL ENCOUNTER (OUTPATIENT)
Dept: ULTRASOUND IMAGING | Facility: HOSPITAL | Age: 39
Discharge: HOME | End: 2024-02-26
Attending: OBSTETRICS & GYNECOLOGY
Payer: COMMERCIAL

## 2024-02-26 DIAGNOSIS — N83.202 LEFT OVARIAN CYST: ICD-10-CM

## 2024-02-26 PROCEDURE — 76856 US EXAM PELVIC COMPLETE: CPT

## 2024-02-26 PROCEDURE — 76830 TRANSVAGINAL US NON-OB: CPT
